# Patient Record
Sex: FEMALE | Race: WHITE | Employment: OTHER | URBAN - METROPOLITAN AREA
[De-identification: names, ages, dates, MRNs, and addresses within clinical notes are randomized per-mention and may not be internally consistent; named-entity substitution may affect disease eponyms.]

---

## 2017-01-01 ENCOUNTER — APPOINTMENT (OUTPATIENT)
Dept: CT IMAGING | Age: 77
DRG: 189 | End: 2017-01-01
Attending: EMERGENCY MEDICINE
Payer: MEDICARE

## 2017-01-01 ENCOUNTER — HOSPITAL ENCOUNTER (OUTPATIENT)
Dept: GENERAL RADIOLOGY | Age: 77
Discharge: HOME | End: 2017-11-20
Attending: NURSE PRACTITIONER
Payer: MEDICARE

## 2017-01-01 ENCOUNTER — HOME HEALTH ADMISSION (OUTPATIENT)
Dept: HOME HEALTH SERVICES | Facility: HOME HEALTH | Age: 77
End: 2017-01-01
Payer: MEDICARE

## 2017-01-01 ENCOUNTER — APPOINTMENT (OUTPATIENT)
Dept: GENERAL RADIOLOGY | Age: 77
DRG: 189 | End: 2017-01-01
Attending: EMERGENCY MEDICINE
Payer: MEDICARE

## 2017-01-01 ENCOUNTER — HOSPITAL ENCOUNTER (INPATIENT)
Age: 77
LOS: 9 days | Discharge: NURSING, REHAB OR LONG-TERM CARE | DRG: 189 | End: 2017-12-11
Attending: EMERGENCY MEDICINE | Admitting: INTERNAL MEDICINE
Payer: MEDICARE

## 2017-01-01 ENCOUNTER — APPOINTMENT (OUTPATIENT)
Dept: GENERAL RADIOLOGY | Age: 77
DRG: 189 | End: 2017-01-01
Attending: INTERNAL MEDICINE
Payer: MEDICARE

## 2017-01-01 VITALS
SYSTOLIC BLOOD PRESSURE: 113 MMHG | TEMPERATURE: 98.3 F | BODY MASS INDEX: 19.47 KG/M2 | RESPIRATION RATE: 20 BRPM | OXYGEN SATURATION: 93 % | HEIGHT: 65 IN | DIASTOLIC BLOOD PRESSURE: 70 MMHG | WEIGHT: 116.9 LBS | HEART RATE: 95 BPM

## 2017-01-01 DIAGNOSIS — I10 ESSENTIAL HYPERTENSION: ICD-10-CM

## 2017-01-01 DIAGNOSIS — J44.1 ACUTE EXACERBATION OF CHRONIC OBSTRUCTIVE PULMONARY DISEASE (COPD) (HCC): Primary | ICD-10-CM

## 2017-01-01 DIAGNOSIS — J20.9 ACUTE BRONCHITIS, UNSPECIFIED ORGANISM: ICD-10-CM

## 2017-01-01 DIAGNOSIS — J90 PLEURAL EFFUSION: ICD-10-CM

## 2017-01-01 DIAGNOSIS — J96.21 ACUTE ON CHRONIC RESPIRATORY FAILURE WITH HYPOXIA AND HYPERCAPNIA (HCC): ICD-10-CM

## 2017-01-01 DIAGNOSIS — R09.02 HYPOXIA: ICD-10-CM

## 2017-01-01 DIAGNOSIS — J96.02 ACUTE RESPIRATORY FAILURE WITH HYPOXIA AND HYPERCARBIA (HCC): ICD-10-CM

## 2017-01-01 DIAGNOSIS — J43.1 PANLOBULAR EMPHYSEMA (HCC): ICD-10-CM

## 2017-01-01 DIAGNOSIS — R06.02 SHORTNESS OF BREATH: ICD-10-CM

## 2017-01-01 DIAGNOSIS — J96.01 ACUTE RESPIRATORY FAILURE WITH HYPOXIA AND HYPERCARBIA (HCC): ICD-10-CM

## 2017-01-01 DIAGNOSIS — R53.81 DEBILITY: ICD-10-CM

## 2017-01-01 DIAGNOSIS — J44.1 CHRONIC OBSTRUCTIVE PULMONARY DISEASE WITH ACUTE EXACERBATION (HCC): ICD-10-CM

## 2017-01-01 DIAGNOSIS — J96.22 ACUTE ON CHRONIC RESPIRATORY FAILURE WITH HYPOXIA AND HYPERCAPNIA (HCC): ICD-10-CM

## 2017-01-01 LAB
ANION GAP SERPL CALC-SCNC: 5 MMOL/L (ref 7–16)
ANION GAP SERPL CALC-SCNC: 8 MMOL/L (ref 7–16)
ARTERIAL PATENCY WRIST A: POSITIVE
ATRIAL RATE: 110 BPM
ATRIAL RATE: 134 BPM
ATRIAL RATE: 99 BPM
BASE EXCESS BLDA CALC-SCNC: 6.8 MMOL/L (ref 0–3)
BASOPHILS # BLD: 0 K/UL (ref 0–0.2)
BASOPHILS # BLD: 0 K/UL (ref 0–0.2)
BASOPHILS NFR BLD: 0 % (ref 0–2)
BASOPHILS NFR BLD: 0 % (ref 0–2)
BDY SITE: ABNORMAL
BNP SERPL-MCNC: 233 PG/ML
BUN SERPL-MCNC: 13 MG/DL (ref 8–23)
BUN SERPL-MCNC: 16 MG/DL (ref 8–23)
CALCIUM SERPL-MCNC: 8.2 MG/DL (ref 8.3–10.4)
CALCIUM SERPL-MCNC: 9.4 MG/DL (ref 8.3–10.4)
CALCULATED P AXIS, ECG09: 86 DEGREES
CALCULATED P AXIS, ECG09: 87 DEGREES
CALCULATED P AXIS, ECG09: 93 DEGREES
CALCULATED R AXIS, ECG10: -81 DEGREES
CALCULATED R AXIS, ECG10: -82 DEGREES
CALCULATED R AXIS, ECG10: -88 DEGREES
CALCULATED T AXIS, ECG11: 85 DEGREES
CALCULATED T AXIS, ECG11: 85 DEGREES
CALCULATED T AXIS, ECG11: 89 DEGREES
CHLORIDE SERPL-SCNC: 97 MMOL/L (ref 98–107)
CHLORIDE SERPL-SCNC: 97 MMOL/L (ref 98–107)
CO2 SERPL-SCNC: 33 MMOL/L (ref 21–32)
CO2 SERPL-SCNC: 34 MMOL/L (ref 21–32)
COHGB MFR BLD: 5.6 % (ref 0.5–1.5)
CREAT SERPL-MCNC: 0.55 MG/DL (ref 0.6–1)
CREAT SERPL-MCNC: 0.58 MG/DL (ref 0.6–1)
D DIMER PPP FEU-MCNC: 0.94 UG/ML(FEU)
DIAGNOSIS, 93000: NORMAL
DIFFERENTIAL METHOD BLD: ABNORMAL
DIFFERENTIAL METHOD BLD: ABNORMAL
DO-HGB BLD-MCNC: 26 % (ref 0–5)
EOSINOPHIL # BLD: 0 K/UL (ref 0–0.8)
EOSINOPHIL # BLD: 0.2 K/UL (ref 0–0.8)
EOSINOPHIL NFR BLD: 0 % (ref 0.5–7.8)
EOSINOPHIL NFR BLD: 1 % (ref 0.5–7.8)
ERYTHROCYTE [DISTWIDTH] IN BLOOD BY AUTOMATED COUNT: 14.6 % (ref 11.9–14.6)
ERYTHROCYTE [DISTWIDTH] IN BLOOD BY AUTOMATED COUNT: 14.7 % (ref 11.9–14.6)
GLUCOSE BLD STRIP.AUTO-MCNC: 105 MG/DL (ref 65–100)
GLUCOSE BLD STRIP.AUTO-MCNC: 109 MG/DL (ref 65–100)
GLUCOSE BLD STRIP.AUTO-MCNC: 109 MG/DL (ref 65–100)
GLUCOSE BLD STRIP.AUTO-MCNC: 110 MG/DL (ref 65–100)
GLUCOSE BLD STRIP.AUTO-MCNC: 116 MG/DL (ref 65–100)
GLUCOSE BLD STRIP.AUTO-MCNC: 116 MG/DL (ref 65–100)
GLUCOSE BLD STRIP.AUTO-MCNC: 120 MG/DL (ref 65–100)
GLUCOSE BLD STRIP.AUTO-MCNC: 125 MG/DL (ref 65–100)
GLUCOSE BLD STRIP.AUTO-MCNC: 127 MG/DL (ref 65–100)
GLUCOSE BLD STRIP.AUTO-MCNC: 128 MG/DL (ref 65–100)
GLUCOSE BLD STRIP.AUTO-MCNC: 129 MG/DL (ref 65–100)
GLUCOSE BLD STRIP.AUTO-MCNC: 131 MG/DL (ref 65–100)
GLUCOSE BLD STRIP.AUTO-MCNC: 131 MG/DL (ref 65–100)
GLUCOSE BLD STRIP.AUTO-MCNC: 134 MG/DL (ref 65–100)
GLUCOSE BLD STRIP.AUTO-MCNC: 137 MG/DL (ref 65–100)
GLUCOSE BLD STRIP.AUTO-MCNC: 138 MG/DL (ref 65–100)
GLUCOSE BLD STRIP.AUTO-MCNC: 140 MG/DL (ref 65–100)
GLUCOSE BLD STRIP.AUTO-MCNC: 146 MG/DL (ref 65–100)
GLUCOSE BLD STRIP.AUTO-MCNC: 150 MG/DL (ref 65–100)
GLUCOSE BLD STRIP.AUTO-MCNC: 157 MG/DL (ref 65–100)
GLUCOSE BLD STRIP.AUTO-MCNC: 158 MG/DL (ref 65–100)
GLUCOSE BLD STRIP.AUTO-MCNC: 166 MG/DL (ref 65–100)
GLUCOSE BLD STRIP.AUTO-MCNC: 78 MG/DL (ref 65–100)
GLUCOSE BLD STRIP.AUTO-MCNC: 83 MG/DL (ref 65–100)
GLUCOSE BLD STRIP.AUTO-MCNC: 85 MG/DL (ref 65–100)
GLUCOSE BLD STRIP.AUTO-MCNC: 86 MG/DL (ref 65–100)
GLUCOSE BLD STRIP.AUTO-MCNC: 86 MG/DL (ref 65–100)
GLUCOSE BLD STRIP.AUTO-MCNC: 87 MG/DL (ref 65–100)
GLUCOSE BLD STRIP.AUTO-MCNC: 87 MG/DL (ref 65–100)
GLUCOSE BLD STRIP.AUTO-MCNC: 90 MG/DL (ref 65–100)
GLUCOSE BLD STRIP.AUTO-MCNC: 92 MG/DL (ref 65–100)
GLUCOSE BLD STRIP.AUTO-MCNC: 94 MG/DL (ref 65–100)
GLUCOSE SERPL-MCNC: 113 MG/DL (ref 65–100)
GLUCOSE SERPL-MCNC: 139 MG/DL (ref 65–100)
HCO3 BLDA-SCNC: 34 MMOL/L (ref 22–26)
HCT VFR BLD AUTO: 41.8 % (ref 35.8–46.3)
HCT VFR BLD AUTO: 43.9 % (ref 35.8–46.3)
HGB BLD-MCNC: 13.1 G/DL (ref 11.7–15.4)
HGB BLD-MCNC: 14.4 G/DL (ref 11.7–15.4)
HGB BLDMV-MCNC: 14.6 GM/DL (ref 11.7–15)
IMM GRANULOCYTES # BLD: 0 K/UL (ref 0–0.5)
IMM GRANULOCYTES # BLD: 0.1 K/UL (ref 0–0.5)
IMM GRANULOCYTES NFR BLD AUTO: 0 % (ref 0–5)
IMM GRANULOCYTES NFR BLD AUTO: 1 % (ref 0–5)
LACTATE BLD-SCNC: 1.2 MMOL/L (ref 0.5–1.9)
LYMPHOCYTES # BLD: 0.5 K/UL (ref 0.5–4.6)
LYMPHOCYTES # BLD: 0.6 K/UL (ref 0.5–4.6)
LYMPHOCYTES NFR BLD: 5 % (ref 13–44)
LYMPHOCYTES NFR BLD: 5 % (ref 13–44)
MAGNESIUM SERPL-MCNC: 2.1 MG/DL (ref 1.8–2.4)
MCH RBC QN AUTO: 28.4 PG (ref 26.1–32.9)
MCH RBC QN AUTO: 29 PG (ref 26.1–32.9)
MCHC RBC AUTO-ENTMCNC: 31.3 G/DL (ref 31.4–35)
MCHC RBC AUTO-ENTMCNC: 32.8 G/DL (ref 31.4–35)
MCV RBC AUTO: 88.3 FL (ref 79.6–97.8)
MCV RBC AUTO: 90.5 FL (ref 79.6–97.8)
METHGB MFR BLD: 0.3 % (ref 0–1.5)
MM INDURATION POC: 0 MM (ref 0–5)
MM INDURATION POC: NORMAL MM (ref 0–5)
MM INDURATION POC: NORMAL MM (ref 0–5)
MONOCYTES # BLD: 0.3 K/UL (ref 0.1–1.3)
MONOCYTES # BLD: 0.4 K/UL (ref 0.1–1.3)
MONOCYTES NFR BLD: 3 % (ref 4–12)
MONOCYTES NFR BLD: 3 % (ref 4–12)
NEUTS SEG # BLD: 12.1 K/UL (ref 1.7–8.2)
NEUTS SEG # BLD: 8 K/UL (ref 1.7–8.2)
NEUTS SEG NFR BLD: 91 % (ref 43–78)
NEUTS SEG NFR BLD: 91 % (ref 43–78)
OXYHGB MFR BLDA: 68.5 % (ref 94–97)
P-R INTERVAL, ECG05: 144 MS
P-R INTERVAL, ECG05: 152 MS
P-R INTERVAL, ECG05: 168 MS
PCO2 BLDA: 60 MMHG (ref 35–45)
PH BLDA: 7.38 [PH] (ref 7.35–7.45)
PLATELET # BLD AUTO: 231 K/UL (ref 150–450)
PLATELET # BLD AUTO: 241 K/UL (ref 150–450)
PMV BLD AUTO: 10.1 FL (ref 10.8–14.1)
PMV BLD AUTO: 10.4 FL (ref 10.8–14.1)
PO2 BLDA: 35 MMHG (ref 80–105)
POTASSIUM SERPL-SCNC: 3.7 MMOL/L (ref 3.5–5.1)
POTASSIUM SERPL-SCNC: 4.4 MMOL/L (ref 3.5–5.1)
PPD POC: NORMAL NEGATIVE
PROCALCITONIN SERPL-MCNC: <0.1 NG/ML
Q-T INTERVAL, ECG07: 272 MS
Q-T INTERVAL, ECG07: 328 MS
Q-T INTERVAL, ECG07: 338 MS
QRS DURATION, ECG06: 84 MS
QRS DURATION, ECG06: 84 MS
QRS DURATION, ECG06: 86 MS
QTC CALCULATION (BEZET), ECG08: 406 MS
QTC CALCULATION (BEZET), ECG08: 420 MS
QTC CALCULATION (BEZET), ECG08: 457 MS
RBC # BLD AUTO: 4.62 M/UL (ref 4.05–5.25)
RBC # BLD AUTO: 4.97 M/UL (ref 4.05–5.25)
SAO2 % BLD: 73 % (ref 92–98.5)
SERVICE CMNT-IMP: ABNORMAL
SODIUM SERPL-SCNC: 136 MMOL/L (ref 136–145)
SODIUM SERPL-SCNC: 138 MMOL/L (ref 136–145)
TROPONIN I BLD-MCNC: 0.01 NG/ML (ref 0.02–0.05)
VENTILATION MODE VENT: ABNORMAL
VENTRICULAR RATE, ECG03: 110 BPM
VENTRICULAR RATE, ECG03: 134 BPM
VENTRICULAR RATE, ECG03: 99 BPM
WBC # BLD AUTO: 13.4 K/UL (ref 4.3–11.1)
WBC # BLD AUTO: 8.7 K/UL (ref 4.3–11.1)

## 2017-01-01 PROCEDURE — 77010033678 HC OXYGEN DAILY

## 2017-01-01 PROCEDURE — 74011250637 HC RX REV CODE- 250/637: Performed by: INTERNAL MEDICINE

## 2017-01-01 PROCEDURE — 97530 THERAPEUTIC ACTIVITIES: CPT

## 2017-01-01 PROCEDURE — 94760 N-INVAS EAR/PLS OXIMETRY 1: CPT

## 2017-01-01 PROCEDURE — 96376 TX/PRO/DX INJ SAME DRUG ADON: CPT | Performed by: EMERGENCY MEDICINE

## 2017-01-01 PROCEDURE — 96365 THER/PROPH/DIAG IV INF INIT: CPT | Performed by: EMERGENCY MEDICINE

## 2017-01-01 PROCEDURE — 74011000250 HC RX REV CODE- 250: Performed by: INTERNAL MEDICINE

## 2017-01-01 PROCEDURE — 99232 SBSQ HOSP IP/OBS MODERATE 35: CPT | Performed by: INTERNAL MEDICINE

## 2017-01-01 PROCEDURE — 93005 ELECTROCARDIOGRAM TRACING: CPT | Performed by: INTERNAL MEDICINE

## 2017-01-01 PROCEDURE — 74011636637 HC RX REV CODE- 636/637: Performed by: INTERNAL MEDICINE

## 2017-01-01 PROCEDURE — 94640 AIRWAY INHALATION TREATMENT: CPT

## 2017-01-01 PROCEDURE — 74011250636 HC RX REV CODE- 250/636: Performed by: INTERNAL MEDICINE

## 2017-01-01 PROCEDURE — 74011636320 HC RX REV CODE- 636/320: Performed by: EMERGENCY MEDICINE

## 2017-01-01 PROCEDURE — 82962 GLUCOSE BLOOD TEST: CPT

## 2017-01-01 PROCEDURE — 80048 BASIC METABOLIC PNL TOTAL CA: CPT | Performed by: INTERNAL MEDICINE

## 2017-01-01 PROCEDURE — 85025 COMPLETE CBC W/AUTO DIFF WBC: CPT | Performed by: INTERNAL MEDICINE

## 2017-01-01 PROCEDURE — 86580 TB INTRADERMAL TEST: CPT | Performed by: INTERNAL MEDICINE

## 2017-01-01 PROCEDURE — 65270000029 HC RM PRIVATE

## 2017-01-01 PROCEDURE — 96375 TX/PRO/DX INJ NEW DRUG ADDON: CPT | Performed by: EMERGENCY MEDICINE

## 2017-01-01 PROCEDURE — 77030020120 HC VLV RESP PEP HI -B

## 2017-01-01 PROCEDURE — 36415 COLL VENOUS BLD VENIPUNCTURE: CPT | Performed by: INTERNAL MEDICINE

## 2017-01-01 PROCEDURE — 74011000250 HC RX REV CODE- 250: Performed by: EMERGENCY MEDICINE

## 2017-01-01 PROCEDURE — 85379 FIBRIN DEGRADATION QUANT: CPT | Performed by: EMERGENCY MEDICINE

## 2017-01-01 PROCEDURE — 84484 ASSAY OF TROPONIN QUANT: CPT

## 2017-01-01 PROCEDURE — 99231 SBSQ HOSP IP/OBS SF/LOW 25: CPT | Performed by: INTERNAL MEDICINE

## 2017-01-01 PROCEDURE — 71020 XR CHEST PA LAT: CPT

## 2017-01-01 PROCEDURE — 80048 BASIC METABOLIC PNL TOTAL CA: CPT | Performed by: EMERGENCY MEDICINE

## 2017-01-01 PROCEDURE — 84145 PROCALCITONIN (PCT): CPT | Performed by: INTERNAL MEDICINE

## 2017-01-01 PROCEDURE — 74011000258 HC RX REV CODE- 258: Performed by: EMERGENCY MEDICINE

## 2017-01-01 PROCEDURE — 94667 MNPJ CHEST WALL 1ST: CPT

## 2017-01-01 PROCEDURE — 97165 OT EVAL LOW COMPLEX 30 MIN: CPT

## 2017-01-01 PROCEDURE — 74011250636 HC RX REV CODE- 250/636: Performed by: EMERGENCY MEDICINE

## 2017-01-01 PROCEDURE — 74011000302 HC RX REV CODE- 302: Performed by: INTERNAL MEDICINE

## 2017-01-01 PROCEDURE — 94668 MNPJ CHEST WALL SBSQ: CPT

## 2017-01-01 PROCEDURE — 99285 EMERGENCY DEPT VISIT HI MDM: CPT | Performed by: EMERGENCY MEDICINE

## 2017-01-01 PROCEDURE — 82803 BLOOD GASES ANY COMBINATION: CPT

## 2017-01-01 PROCEDURE — 74011000258 HC RX REV CODE- 258: Performed by: INTERNAL MEDICINE

## 2017-01-01 PROCEDURE — 93005 ELECTROCARDIOGRAM TRACING: CPT | Performed by: EMERGENCY MEDICINE

## 2017-01-01 PROCEDURE — 97162 PT EVAL MOD COMPLEX 30 MIN: CPT

## 2017-01-01 PROCEDURE — 83880 ASSAY OF NATRIURETIC PEPTIDE: CPT | Performed by: EMERGENCY MEDICINE

## 2017-01-01 PROCEDURE — 36600 WITHDRAWAL OF ARTERIAL BLOOD: CPT

## 2017-01-01 PROCEDURE — 71260 CT THORAX DX C+: CPT

## 2017-01-01 PROCEDURE — 77030012341 HC CHMB SPCR OPTC MDI VYRM -A

## 2017-01-01 PROCEDURE — 71010 XR CHEST SNGL V: CPT

## 2017-01-01 PROCEDURE — 85025 COMPLETE CBC W/AUTO DIFF WBC: CPT | Performed by: EMERGENCY MEDICINE

## 2017-01-01 PROCEDURE — 83605 ASSAY OF LACTIC ACID: CPT

## 2017-01-01 PROCEDURE — 99239 HOSP IP/OBS DSCHRG MGMT >30: CPT | Performed by: INTERNAL MEDICINE

## 2017-01-01 PROCEDURE — 83735 ASSAY OF MAGNESIUM: CPT | Performed by: EMERGENCY MEDICINE

## 2017-01-01 PROCEDURE — 99223 1ST HOSP IP/OBS HIGH 75: CPT | Performed by: INTERNAL MEDICINE

## 2017-01-01 RX ORDER — SODIUM CHLORIDE 0.9 % (FLUSH) 0.9 %
10 SYRINGE (ML) INJECTION
Status: COMPLETED | OUTPATIENT
Start: 2017-01-01 | End: 2017-01-01

## 2017-01-01 RX ORDER — IPRATROPIUM BROMIDE AND ALBUTEROL SULFATE 2.5; .5 MG/3ML; MG/3ML
3 SOLUTION RESPIRATORY (INHALATION)
Status: DISCONTINUED | OUTPATIENT
Start: 2017-01-01 | End: 2017-01-01

## 2017-01-01 RX ORDER — ALBUTEROL SULFATE 0.83 MG/ML
5 SOLUTION RESPIRATORY (INHALATION)
Status: COMPLETED | OUTPATIENT
Start: 2017-01-01 | End: 2017-01-01

## 2017-01-01 RX ORDER — ALBUTEROL SULFATE 0.83 MG/ML
2.5 SOLUTION RESPIRATORY (INHALATION)
Status: DISCONTINUED | OUTPATIENT
Start: 2017-01-01 | End: 2017-01-01 | Stop reason: HOSPADM

## 2017-01-01 RX ORDER — LEVOFLOXACIN 5 MG/ML
750 INJECTION, SOLUTION INTRAVENOUS
Status: COMPLETED | OUTPATIENT
Start: 2017-01-01 | End: 2017-01-01

## 2017-01-01 RX ORDER — INSULIN LISPRO 100 [IU]/ML
INJECTION, SOLUTION INTRAVENOUS; SUBCUTANEOUS
Status: DISCONTINUED | OUTPATIENT
Start: 2017-01-01 | End: 2017-01-01 | Stop reason: HOSPADM

## 2017-01-01 RX ORDER — MORPHINE SULFATE 2 MG/ML
2 INJECTION, SOLUTION INTRAMUSCULAR; INTRAVENOUS
Status: DISCONTINUED | OUTPATIENT
Start: 2017-01-01 | End: 2017-01-01 | Stop reason: HOSPADM

## 2017-01-01 RX ORDER — MONTELUKAST SODIUM 10 MG/1
10 TABLET ORAL DAILY
Status: DISCONTINUED | OUTPATIENT
Start: 2017-01-01 | End: 2017-01-01 | Stop reason: HOSPADM

## 2017-01-01 RX ORDER — DOCUSATE SODIUM 100 MG/1
100 CAPSULE, LIQUID FILLED ORAL 2 TIMES DAILY
Qty: 1 CAP | Refills: 0 | Status: SHIPPED
Start: 2017-01-01 | End: 2018-01-01

## 2017-01-01 RX ORDER — SODIUM CHLORIDE 0.9 % (FLUSH) 0.9 %
5-10 SYRINGE (ML) INJECTION EVERY 8 HOURS
Status: DISCONTINUED | OUTPATIENT
Start: 2017-01-01 | End: 2017-01-01 | Stop reason: HOSPADM

## 2017-01-01 RX ORDER — BUDESONIDE 0.5 MG/2ML
500 INHALANT ORAL
Status: DISCONTINUED | OUTPATIENT
Start: 2017-01-01 | End: 2017-01-01 | Stop reason: HOSPADM

## 2017-01-01 RX ORDER — ALBUTEROL SULFATE 0.83 MG/ML
2.5 SOLUTION RESPIRATORY (INHALATION) 4 TIMES DAILY
Qty: 24 EACH | Refills: 0 | Status: SHIPPED
Start: 2017-01-01 | End: 2018-01-01 | Stop reason: SDUPTHER

## 2017-01-01 RX ORDER — GUAIFENESIN 600 MG/1
1200 TABLET, EXTENDED RELEASE ORAL 2 TIMES DAILY
Status: DISCONTINUED | OUTPATIENT
Start: 2017-01-01 | End: 2017-01-01 | Stop reason: HOSPADM

## 2017-01-01 RX ORDER — FAMOTIDINE 20 MG/1
20 TABLET, FILM COATED ORAL DAILY
Status: DISCONTINUED | OUTPATIENT
Start: 2017-01-01 | End: 2017-01-01 | Stop reason: HOSPADM

## 2017-01-01 RX ORDER — CEFTRIAXONE 1 G/1
1 INJECTION, POWDER, FOR SOLUTION INTRAMUSCULAR; INTRAVENOUS ONCE
Status: DISCONTINUED | OUTPATIENT
Start: 2017-01-01 | End: 2017-01-01 | Stop reason: SDUPTHER

## 2017-01-01 RX ORDER — DOCUSATE SODIUM 100 MG/1
100 CAPSULE, LIQUID FILLED ORAL 2 TIMES DAILY
Status: DISCONTINUED | OUTPATIENT
Start: 2017-01-01 | End: 2017-01-01 | Stop reason: HOSPADM

## 2017-01-01 RX ORDER — DILTIAZEM HYDROCHLORIDE 5 MG/ML
20 INJECTION INTRAVENOUS
Status: COMPLETED | OUTPATIENT
Start: 2017-01-01 | End: 2017-01-01

## 2017-01-01 RX ORDER — ENOXAPARIN SODIUM 100 MG/ML
30 INJECTION SUBCUTANEOUS EVERY 24 HOURS
Status: DISCONTINUED | OUTPATIENT
Start: 2017-01-01 | End: 2017-01-01 | Stop reason: HOSPADM

## 2017-01-01 RX ORDER — DILTIAZEM HYDROCHLORIDE 5 MG/ML
15 INJECTION INTRAVENOUS
Status: COMPLETED | OUTPATIENT
Start: 2017-01-01 | End: 2017-01-01

## 2017-01-01 RX ORDER — FAMOTIDINE 20 MG/1
20 TABLET, FILM COATED ORAL DAILY
Qty: 1 TAB | Refills: 0 | Status: SHIPPED
Start: 2017-01-01 | End: 2018-01-01 | Stop reason: SDUPTHER

## 2017-01-01 RX ORDER — PREDNISONE 20 MG/1
TABLET ORAL
Qty: 6 TAB | Refills: 0 | Status: SHIPPED
Start: 2017-01-01 | End: 2018-01-01

## 2017-01-01 RX ORDER — LISINOPRIL 20 MG/1
20 TABLET ORAL DAILY
Status: DISCONTINUED | OUTPATIENT
Start: 2017-01-01 | End: 2017-01-01 | Stop reason: HOSPADM

## 2017-01-01 RX ORDER — SODIUM CHLORIDE 0.9 % (FLUSH) 0.9 %
5-10 SYRINGE (ML) INJECTION AS NEEDED
Status: DISCONTINUED | OUTPATIENT
Start: 2017-01-01 | End: 2017-01-01 | Stop reason: HOSPADM

## 2017-01-01 RX ADMIN — IPRATROPIUM BROMIDE AND ALBUTEROL SULFATE 3 ML: .5; 3 SOLUTION RESPIRATORY (INHALATION) at 19:51

## 2017-01-01 RX ADMIN — LISINOPRIL 20 MG: 20 TABLET ORAL at 08:59

## 2017-01-01 RX ADMIN — IPRATROPIUM BROMIDE AND ALBUTEROL SULFATE 3 ML: .5; 3 SOLUTION RESPIRATORY (INHALATION) at 03:37

## 2017-01-01 RX ADMIN — DOCUSATE SODIUM 100 MG: 100 CAPSULE, LIQUID FILLED ORAL at 08:59

## 2017-01-01 RX ADMIN — ENOXAPARIN SODIUM 30 MG: 30 INJECTION SUBCUTANEOUS at 09:08

## 2017-01-01 RX ADMIN — IPRATROPIUM BROMIDE AND ALBUTEROL SULFATE 3 ML: .5; 3 SOLUTION RESPIRATORY (INHALATION) at 03:44

## 2017-01-01 RX ADMIN — METHYLPREDNISOLONE SODIUM SUCCINATE 60 MG: 125 INJECTION, POWDER, FOR SOLUTION INTRAMUSCULAR; INTRAVENOUS at 22:34

## 2017-01-01 RX ADMIN — ENOXAPARIN SODIUM 30 MG: 30 INJECTION SUBCUTANEOUS at 08:52

## 2017-01-01 RX ADMIN — ENOXAPARIN SODIUM 30 MG: 30 INJECTION SUBCUTANEOUS at 08:59

## 2017-01-01 RX ADMIN — BUDESONIDE 500 MCG: 0.5 INHALANT RESPIRATORY (INHALATION) at 08:18

## 2017-01-01 RX ADMIN — MONTELUKAST SODIUM 10 MG: 10 TABLET, FILM COATED ORAL at 08:41

## 2017-01-01 RX ADMIN — Medication 5 ML: at 06:20

## 2017-01-01 RX ADMIN — MONTELUKAST SODIUM 10 MG: 10 TABLET, FILM COATED ORAL at 08:30

## 2017-01-01 RX ADMIN — Medication 5 ML: at 12:01

## 2017-01-01 RX ADMIN — Medication 10 ML: at 22:34

## 2017-01-01 RX ADMIN — ENOXAPARIN SODIUM 30 MG: 30 INJECTION SUBCUTANEOUS at 08:32

## 2017-01-01 RX ADMIN — METHYLPREDNISOLONE SODIUM SUCCINATE 60 MG: 125 INJECTION, POWDER, FOR SOLUTION INTRAMUSCULAR; INTRAVENOUS at 21:36

## 2017-01-01 RX ADMIN — BUDESONIDE 500 MCG: 0.5 INHALANT RESPIRATORY (INHALATION) at 20:07

## 2017-01-01 RX ADMIN — IPRATROPIUM BROMIDE AND ALBUTEROL SULFATE 3 ML: .5; 3 SOLUTION RESPIRATORY (INHALATION) at 23:43

## 2017-01-01 RX ADMIN — CEFTRIAXONE SODIUM 1 G: 1 INJECTION, POWDER, FOR SOLUTION INTRAMUSCULAR; INTRAVENOUS at 05:00

## 2017-01-01 RX ADMIN — MONTELUKAST SODIUM 10 MG: 10 TABLET, FILM COATED ORAL at 08:21

## 2017-01-01 RX ADMIN — MONTELUKAST SODIUM 10 MG: 10 TABLET, FILM COATED ORAL at 08:59

## 2017-01-01 RX ADMIN — ALBUTEROL SULFATE 2.5 MG: 2.5 SOLUTION RESPIRATORY (INHALATION) at 15:11

## 2017-01-01 RX ADMIN — DILTIAZEM HYDROCHLORIDE 15 MG: 5 INJECTION INTRAVENOUS at 21:52

## 2017-01-01 RX ADMIN — METHYLPREDNISOLONE SODIUM SUCCINATE 60 MG: 125 INJECTION, POWDER, FOR SOLUTION INTRAMUSCULAR; INTRAVENOUS at 22:15

## 2017-01-01 RX ADMIN — FAMOTIDINE 20 MG: 20 TABLET ORAL at 08:46

## 2017-01-01 RX ADMIN — ALBUTEROL SULFATE 2.5 MG: 2.5 SOLUTION RESPIRATORY (INHALATION) at 20:51

## 2017-01-01 RX ADMIN — Medication 5 ML: at 11:49

## 2017-01-01 RX ADMIN — IPRATROPIUM BROMIDE AND ALBUTEROL SULFATE 3 ML: .5; 3 SOLUTION RESPIRATORY (INHALATION) at 11:31

## 2017-01-01 RX ADMIN — METHYLPREDNISOLONE SODIUM SUCCINATE 60 MG: 125 INJECTION, POWDER, FOR SOLUTION INTRAMUSCULAR; INTRAVENOUS at 05:59

## 2017-01-01 RX ADMIN — Medication 5 ML: at 11:25

## 2017-01-01 RX ADMIN — IPRATROPIUM BROMIDE AND ALBUTEROL SULFATE 3 ML: .5; 3 SOLUTION RESPIRATORY (INHALATION) at 20:20

## 2017-01-01 RX ADMIN — Medication 5 ML: at 05:23

## 2017-01-01 RX ADMIN — METHYLPREDNISOLONE SODIUM SUCCINATE 60 MG: 125 INJECTION, POWDER, FOR SOLUTION INTRAMUSCULAR; INTRAVENOUS at 08:41

## 2017-01-01 RX ADMIN — BUDESONIDE 500 MCG: 0.5 INHALANT RESPIRATORY (INHALATION) at 19:51

## 2017-01-01 RX ADMIN — BUDESONIDE 500 MCG: 0.5 INHALANT RESPIRATORY (INHALATION) at 07:34

## 2017-01-01 RX ADMIN — LISINOPRIL 20 MG: 20 TABLET ORAL at 08:22

## 2017-01-01 RX ADMIN — DOCUSATE SODIUM 100 MG: 100 CAPSULE, LIQUID FILLED ORAL at 08:30

## 2017-01-01 RX ADMIN — BUDESONIDE 500 MCG: 0.5 INHALANT RESPIRATORY (INHALATION) at 07:17

## 2017-01-01 RX ADMIN — Medication 10 ML: at 12:38

## 2017-01-01 RX ADMIN — IPRATROPIUM BROMIDE AND ALBUTEROL SULFATE 3 ML: .5; 3 SOLUTION RESPIRATORY (INHALATION) at 10:45

## 2017-01-01 RX ADMIN — GUAIFENESIN 1200 MG: 600 TABLET, EXTENDED RELEASE ORAL at 16:11

## 2017-01-01 RX ADMIN — FAMOTIDINE 20 MG: 20 TABLET ORAL at 08:21

## 2017-01-01 RX ADMIN — METHYLPREDNISOLONE SODIUM SUCCINATE 30 MG: 40 INJECTION, POWDER, FOR SOLUTION INTRAMUSCULAR; INTRAVENOUS at 08:22

## 2017-01-01 RX ADMIN — Medication 10 ML: at 14:53

## 2017-01-01 RX ADMIN — IPRATROPIUM BROMIDE AND ALBUTEROL SULFATE 3 ML: .5; 3 SOLUTION RESPIRATORY (INHALATION) at 15:22

## 2017-01-01 RX ADMIN — ENOXAPARIN SODIUM 30 MG: 30 INJECTION SUBCUTANEOUS at 08:30

## 2017-01-01 RX ADMIN — BUDESONIDE 500 MCG: 0.5 INHALANT RESPIRATORY (INHALATION) at 19:37

## 2017-01-01 RX ADMIN — DOCUSATE SODIUM 100 MG: 100 CAPSULE, LIQUID FILLED ORAL at 00:51

## 2017-01-01 RX ADMIN — DOCUSATE SODIUM 100 MG: 100 CAPSULE, LIQUID FILLED ORAL at 16:11

## 2017-01-01 RX ADMIN — ALBUTEROL SULFATE 2.5 MG: 2.5 SOLUTION RESPIRATORY (INHALATION) at 20:36

## 2017-01-01 RX ADMIN — METHYLPREDNISOLONE SODIUM SUCCINATE 60 MG: 125 INJECTION, POWDER, FOR SOLUTION INTRAMUSCULAR; INTRAVENOUS at 06:00

## 2017-01-01 RX ADMIN — IPRATROPIUM BROMIDE AND ALBUTEROL SULFATE 3 ML: .5; 3 SOLUTION RESPIRATORY (INHALATION) at 20:47

## 2017-01-01 RX ADMIN — DILTIAZEM HYDROCHLORIDE 20 MG: 5 INJECTION INTRAVENOUS at 20:55

## 2017-01-01 RX ADMIN — FAMOTIDINE 20 MG: 20 TABLET ORAL at 09:09

## 2017-01-01 RX ADMIN — Medication 5 ML: at 14:25

## 2017-01-01 RX ADMIN — IPRATROPIUM BROMIDE AND ALBUTEROL SULFATE 3 ML: .5; 3 SOLUTION RESPIRATORY (INHALATION) at 16:14

## 2017-01-01 RX ADMIN — GUAIFENESIN 1200 MG: 600 TABLET, EXTENDED RELEASE ORAL at 08:45

## 2017-01-01 RX ADMIN — GUAIFENESIN 1200 MG: 600 TABLET, EXTENDED RELEASE ORAL at 08:22

## 2017-01-01 RX ADMIN — BUDESONIDE 500 MCG: 0.5 INHALANT RESPIRATORY (INHALATION) at 20:36

## 2017-01-01 RX ADMIN — BUDESONIDE 500 MCG: 0.5 INHALANT RESPIRATORY (INHALATION) at 07:30

## 2017-01-01 RX ADMIN — Medication 5 ML: at 16:52

## 2017-01-01 RX ADMIN — Medication 5 ML: at 16:47

## 2017-01-01 RX ADMIN — Medication 5 ML: at 16:15

## 2017-01-01 RX ADMIN — FAMOTIDINE 20 MG: 20 TABLET ORAL at 08:57

## 2017-01-01 RX ADMIN — IPRATROPIUM BROMIDE AND ALBUTEROL SULFATE 3 ML: .5; 3 SOLUTION RESPIRATORY (INHALATION) at 00:07

## 2017-01-01 RX ADMIN — GUAIFENESIN 1200 MG: 600 TABLET, EXTENDED RELEASE ORAL at 16:53

## 2017-01-01 RX ADMIN — ENOXAPARIN SODIUM 30 MG: 30 INJECTION SUBCUTANEOUS at 08:21

## 2017-01-01 RX ADMIN — Medication 10 ML: at 22:00

## 2017-01-01 RX ADMIN — Medication 5 ML: at 12:00

## 2017-01-01 RX ADMIN — DOCUSATE SODIUM 100 MG: 100 CAPSULE, LIQUID FILLED ORAL at 16:48

## 2017-01-01 RX ADMIN — ALBUTEROL SULFATE 2.5 MG: 2.5 SOLUTION RESPIRATORY (INHALATION) at 08:26

## 2017-01-01 RX ADMIN — Medication 5 ML: at 11:56

## 2017-01-01 RX ADMIN — DOCUSATE SODIUM 100 MG: 100 CAPSULE, LIQUID FILLED ORAL at 08:33

## 2017-01-01 RX ADMIN — IPRATROPIUM BROMIDE AND ALBUTEROL SULFATE 3 ML: .5; 3 SOLUTION RESPIRATORY (INHALATION) at 07:34

## 2017-01-01 RX ADMIN — BUDESONIDE 500 MCG: 0.5 INHALANT RESPIRATORY (INHALATION) at 19:38

## 2017-01-01 RX ADMIN — ALBUTEROL SULFATE 2.5 MG: 2.5 SOLUTION RESPIRATORY (INHALATION) at 12:51

## 2017-01-01 RX ADMIN — ALBUTEROL SULFATE 2.5 MG: 2.5 SOLUTION RESPIRATORY (INHALATION) at 11:13

## 2017-01-01 RX ADMIN — IPRATROPIUM BROMIDE AND ALBUTEROL SULFATE 3 ML: .5; 3 SOLUTION RESPIRATORY (INHALATION) at 04:55

## 2017-01-01 RX ADMIN — Medication 5 ML: at 00:32

## 2017-01-01 RX ADMIN — DOCUSATE SODIUM 100 MG: 100 CAPSULE, LIQUID FILLED ORAL at 08:22

## 2017-01-01 RX ADMIN — Medication 5 ML: at 16:54

## 2017-01-01 RX ADMIN — FAMOTIDINE 20 MG: 20 TABLET ORAL at 08:22

## 2017-01-01 RX ADMIN — DOCUSATE SODIUM 100 MG: 100 CAPSULE, LIQUID FILLED ORAL at 08:52

## 2017-01-01 RX ADMIN — GUAIFENESIN 1200 MG: 600 TABLET, EXTENDED RELEASE ORAL at 08:52

## 2017-01-01 RX ADMIN — Medication 5 ML: at 01:00

## 2017-01-01 RX ADMIN — BUDESONIDE 500 MCG: 0.5 INHALANT RESPIRATORY (INHALATION) at 07:15

## 2017-01-01 RX ADMIN — ALBUTEROL SULFATE 5 MG: 2.5 SOLUTION RESPIRATORY (INHALATION) at 00:07

## 2017-01-01 RX ADMIN — IOPAMIDOL 100 ML: 755 INJECTION, SOLUTION INTRAVENOUS at 23:34

## 2017-01-01 RX ADMIN — LISINOPRIL 20 MG: 20 TABLET ORAL at 09:09

## 2017-01-01 RX ADMIN — Medication 5 ML: at 16:58

## 2017-01-01 RX ADMIN — DOCUSATE SODIUM 100 MG: 100 CAPSULE, LIQUID FILLED ORAL at 08:41

## 2017-01-01 RX ADMIN — FAMOTIDINE 20 MG: 20 TABLET ORAL at 08:30

## 2017-01-01 RX ADMIN — Medication 10 ML: at 05:43

## 2017-01-01 RX ADMIN — LEVOFLOXACIN 750 MG: 5 INJECTION, SOLUTION INTRAVENOUS at 00:27

## 2017-01-01 RX ADMIN — METHYLPREDNISOLONE SODIUM SUCCINATE 60 MG: 125 INJECTION, POWDER, FOR SOLUTION INTRAMUSCULAR; INTRAVENOUS at 14:25

## 2017-01-01 RX ADMIN — Medication 5 ML: at 09:10

## 2017-01-01 RX ADMIN — Medication 10 ML: at 14:21

## 2017-01-01 RX ADMIN — MONTELUKAST SODIUM 10 MG: 10 TABLET, FILM COATED ORAL at 08:52

## 2017-01-01 RX ADMIN — Medication 10 ML: at 16:52

## 2017-01-01 RX ADMIN — IPRATROPIUM BROMIDE AND ALBUTEROL SULFATE 3 ML: .5; 3 SOLUTION RESPIRATORY (INHALATION) at 19:37

## 2017-01-01 RX ADMIN — Medication 10 ML: at 23:34

## 2017-01-01 RX ADMIN — Medication 5 ML: at 06:01

## 2017-01-01 RX ADMIN — MONTELUKAST SODIUM 10 MG: 10 TABLET, FILM COATED ORAL at 09:09

## 2017-01-01 RX ADMIN — DOCUSATE SODIUM 100 MG: 100 CAPSULE, LIQUID FILLED ORAL at 16:53

## 2017-01-01 RX ADMIN — Medication 5 ML: at 12:12

## 2017-01-01 RX ADMIN — IPRATROPIUM BROMIDE AND ALBUTEROL SULFATE 3 ML: .5; 3 SOLUTION RESPIRATORY (INHALATION) at 11:52

## 2017-01-01 RX ADMIN — IPRATROPIUM BROMIDE AND ALBUTEROL SULFATE 3 ML: .5; 3 SOLUTION RESPIRATORY (INHALATION) at 03:59

## 2017-01-01 RX ADMIN — BUDESONIDE 500 MCG: 0.5 INHALANT RESPIRATORY (INHALATION) at 07:08

## 2017-01-01 RX ADMIN — SODIUM CHLORIDE 100 ML: 900 INJECTION, SOLUTION INTRAVENOUS at 23:34

## 2017-01-01 RX ADMIN — IPRATROPIUM BROMIDE AND ALBUTEROL SULFATE 3 ML: .5; 3 SOLUTION RESPIRATORY (INHALATION) at 08:18

## 2017-01-01 RX ADMIN — Medication 5 ML: at 22:00

## 2017-01-01 RX ADMIN — BUDESONIDE 500 MCG: 0.5 INHALANT RESPIRATORY (INHALATION) at 20:20

## 2017-01-01 RX ADMIN — METHYLPREDNISOLONE SODIUM SUCCINATE 20 MG: 40 INJECTION, POWDER, FOR SOLUTION INTRAMUSCULAR; INTRAVENOUS at 08:30

## 2017-01-01 RX ADMIN — Medication 5 ML: at 23:35

## 2017-01-01 RX ADMIN — Medication 5 ML: at 05:59

## 2017-01-01 RX ADMIN — FAMOTIDINE 20 MG: 20 TABLET ORAL at 08:32

## 2017-01-01 RX ADMIN — Medication 10 ML: at 05:17

## 2017-01-01 RX ADMIN — IPRATROPIUM BROMIDE AND ALBUTEROL SULFATE 3 ML: .5; 3 SOLUTION RESPIRATORY (INHALATION) at 07:30

## 2017-01-01 RX ADMIN — ALBUTEROL SULFATE 2.5 MG: 2.5 SOLUTION RESPIRATORY (INHALATION) at 12:01

## 2017-01-01 RX ADMIN — BUDESONIDE 500 MCG: 0.5 INHALANT RESPIRATORY (INHALATION) at 20:51

## 2017-01-01 RX ADMIN — Medication 5 ML: at 11:31

## 2017-01-01 RX ADMIN — Medication 5 ML: at 16:12

## 2017-01-01 RX ADMIN — ALBUTEROL SULFATE 2.5 MG: 2.5 SOLUTION RESPIRATORY (INHALATION) at 07:07

## 2017-01-01 RX ADMIN — Medication 5 ML: at 05:43

## 2017-01-01 RX ADMIN — ALBUTEROL SULFATE 2.5 MG: 2.5 SOLUTION RESPIRATORY (INHALATION) at 16:28

## 2017-01-01 RX ADMIN — DOCUSATE SODIUM 100 MG: 100 CAPSULE, LIQUID FILLED ORAL at 08:47

## 2017-01-01 RX ADMIN — METHYLPREDNISOLONE SODIUM SUCCINATE 60 MG: 125 INJECTION, POWDER, FOR SOLUTION INTRAMUSCULAR; INTRAVENOUS at 22:10

## 2017-01-01 RX ADMIN — IPRATROPIUM BROMIDE AND ALBUTEROL SULFATE 3 ML: .5; 3 SOLUTION RESPIRATORY (INHALATION) at 19:49

## 2017-01-01 RX ADMIN — Medication 5 ML: at 05:48

## 2017-01-01 RX ADMIN — Medication 5 ML: at 22:03

## 2017-01-01 RX ADMIN — ENOXAPARIN SODIUM 30 MG: 30 INJECTION SUBCUTANEOUS at 08:57

## 2017-01-01 RX ADMIN — IPRATROPIUM BROMIDE AND ALBUTEROL SULFATE 3 ML: .5; 3 SOLUTION RESPIRATORY (INHALATION) at 15:05

## 2017-01-01 RX ADMIN — BUDESONIDE 500 MCG: 0.5 INHALANT RESPIRATORY (INHALATION) at 20:47

## 2017-01-01 RX ADMIN — GUAIFENESIN 1200 MG: 600 TABLET, EXTENDED RELEASE ORAL at 08:30

## 2017-01-01 RX ADMIN — Medication 5 ML: at 11:32

## 2017-01-01 RX ADMIN — Medication 5 ML: at 12:59

## 2017-01-01 RX ADMIN — LISINOPRIL 20 MG: 20 TABLET ORAL at 08:45

## 2017-01-01 RX ADMIN — Medication 5 ML: at 17:29

## 2017-01-01 RX ADMIN — METHYLPREDNISOLONE SODIUM SUCCINATE 40 MG: 40 INJECTION, POWDER, FOR SOLUTION INTRAMUSCULAR; INTRAVENOUS at 05:17

## 2017-01-01 RX ADMIN — INSULIN LISPRO 2 UNITS: 100 INJECTION, SOLUTION INTRAVENOUS; SUBCUTANEOUS at 16:48

## 2017-01-01 RX ADMIN — BUDESONIDE 500 MCG: 0.5 INHALANT RESPIRATORY (INHALATION) at 08:46

## 2017-01-01 RX ADMIN — MONTELUKAST SODIUM 10 MG: 10 TABLET, FILM COATED ORAL at 08:22

## 2017-01-01 RX ADMIN — METHYLPREDNISOLONE SODIUM SUCCINATE 40 MG: 40 INJECTION, POWDER, FOR SOLUTION INTRAMUSCULAR; INTRAVENOUS at 08:52

## 2017-01-01 RX ADMIN — BUDESONIDE 500 MCG: 0.5 INHALANT RESPIRATORY (INHALATION) at 19:49

## 2017-01-01 RX ADMIN — LISINOPRIL 20 MG: 20 TABLET ORAL at 08:30

## 2017-01-01 RX ADMIN — LISINOPRIL 20 MG: 20 TABLET ORAL at 08:57

## 2017-01-01 RX ADMIN — Medication 5 ML: at 22:16

## 2017-01-01 RX ADMIN — Medication 5 ML: at 16:48

## 2017-01-01 RX ADMIN — METHYLPREDNISOLONE SODIUM SUCCINATE 20 MG: 40 INJECTION, POWDER, FOR SOLUTION INTRAMUSCULAR; INTRAVENOUS at 08:46

## 2017-01-01 RX ADMIN — ENOXAPARIN SODIUM 30 MG: 30 INJECTION SUBCUTANEOUS at 08:22

## 2017-01-01 RX ADMIN — INSULIN LISPRO 2 UNITS: 100 INJECTION, SOLUTION INTRAVENOUS; SUBCUTANEOUS at 05:58

## 2017-01-01 RX ADMIN — ENOXAPARIN SODIUM 30 MG: 30 INJECTION SUBCUTANEOUS at 08:40

## 2017-01-01 RX ADMIN — ALBUTEROL SULFATE 2.5 MG: 2.5 SOLUTION RESPIRATORY (INHALATION) at 15:17

## 2017-01-01 RX ADMIN — IPRATROPIUM BROMIDE AND ALBUTEROL SULFATE 3 ML: .5; 3 SOLUTION RESPIRATORY (INHALATION) at 04:41

## 2017-01-01 RX ADMIN — METHYLPREDNISOLONE SODIUM SUCCINATE 40 MG: 40 INJECTION, POWDER, FOR SOLUTION INTRAMUSCULAR; INTRAVENOUS at 16:15

## 2017-01-01 RX ADMIN — DOCUSATE SODIUM 100 MG: 100 CAPSULE, LIQUID FILLED ORAL at 16:50

## 2017-01-01 RX ADMIN — Medication 10 ML: at 06:00

## 2017-01-01 RX ADMIN — IPRATROPIUM BROMIDE AND ALBUTEROL SULFATE 3 ML: .5; 3 SOLUTION RESPIRATORY (INHALATION) at 08:45

## 2017-01-01 RX ADMIN — FAMOTIDINE 20 MG: 20 TABLET ORAL at 08:52

## 2017-01-01 RX ADMIN — METHYLPREDNISOLONE SODIUM SUCCINATE 60 MG: 125 INJECTION, POWDER, FOR SOLUTION INTRAMUSCULAR; INTRAVENOUS at 08:57

## 2017-01-01 RX ADMIN — ALBUTEROL SULFATE 2.5 MG: 2.5 SOLUTION RESPIRATORY (INHALATION) at 19:39

## 2017-01-01 RX ADMIN — IPRATROPIUM BROMIDE AND ALBUTEROL SULFATE 3 ML: .5; 3 SOLUTION RESPIRATORY (INHALATION) at 23:19

## 2017-01-01 RX ADMIN — GUAIFENESIN 1200 MG: 600 TABLET, EXTENDED RELEASE ORAL at 16:51

## 2017-01-01 RX ADMIN — METHYLPREDNISOLONE SODIUM SUCCINATE 20 MG: 40 INJECTION, POWDER, FOR SOLUTION INTRAMUSCULAR; INTRAVENOUS at 16:53

## 2017-01-01 RX ADMIN — Medication 5 ML: at 06:02

## 2017-01-01 RX ADMIN — LISINOPRIL 20 MG: 20 TABLET ORAL at 08:52

## 2017-01-01 RX ADMIN — Medication 5 ML: at 05:37

## 2017-01-01 RX ADMIN — DOCUSATE SODIUM 100 MG: 100 CAPSULE, LIQUID FILLED ORAL at 16:15

## 2017-01-01 RX ADMIN — MONTELUKAST SODIUM 10 MG: 10 TABLET, FILM COATED ORAL at 08:57

## 2017-01-01 RX ADMIN — Medication 10 ML: at 16:09

## 2017-01-01 RX ADMIN — METHYLPREDNISOLONE SODIUM SUCCINATE 30 MG: 40 INJECTION, POWDER, FOR SOLUTION INTRAMUSCULAR; INTRAVENOUS at 16:49

## 2017-01-01 RX ADMIN — MONTELUKAST SODIUM 10 MG: 10 TABLET, FILM COATED ORAL at 08:33

## 2017-01-01 RX ADMIN — METHYLPREDNISOLONE SODIUM SUCCINATE 20 MG: 40 INJECTION, POWDER, FOR SOLUTION INTRAMUSCULAR; INTRAVENOUS at 16:09

## 2017-01-01 RX ADMIN — TUBERCULIN PURIFIED PROTEIN DERIVATIVE 5 UNITS: 5 INJECTION, SOLUTION INTRADERMAL at 14:25

## 2017-01-01 RX ADMIN — IPRATROPIUM BROMIDE AND ALBUTEROL SULFATE 3 ML: .5; 3 SOLUTION RESPIRATORY (INHALATION) at 07:15

## 2017-01-01 RX ADMIN — FAMOTIDINE 20 MG: 20 TABLET ORAL at 08:40

## 2017-01-01 RX ADMIN — Medication 5 ML: at 00:51

## 2017-01-01 RX ADMIN — BUDESONIDE 500 MCG: 0.5 INHALANT RESPIRATORY (INHALATION) at 08:45

## 2017-01-01 RX ADMIN — ENOXAPARIN SODIUM 30 MG: 30 INJECTION SUBCUTANEOUS at 08:46

## 2017-01-01 RX ADMIN — Medication 5 ML: at 17:05

## 2017-01-01 RX ADMIN — DOCUSATE SODIUM 100 MG: 100 CAPSULE, LIQUID FILLED ORAL at 17:28

## 2017-01-01 RX ADMIN — LISINOPRIL 20 MG: 20 TABLET ORAL at 08:33

## 2017-01-01 RX ADMIN — ALBUTEROL SULFATE 2.5 MG: 2.5 SOLUTION RESPIRATORY (INHALATION) at 11:43

## 2017-01-01 RX ADMIN — IPRATROPIUM BROMIDE AND ALBUTEROL SULFATE 3 ML: .5; 3 SOLUTION RESPIRATORY (INHALATION) at 07:17

## 2017-01-01 RX ADMIN — Medication 10 ML: at 22:10

## 2017-01-01 RX ADMIN — IPRATROPIUM BROMIDE AND ALBUTEROL SULFATE 3 ML: .5; 3 SOLUTION RESPIRATORY (INHALATION) at 04:06

## 2017-01-01 RX ADMIN — METHYLPREDNISOLONE SODIUM SUCCINATE 60 MG: 125 INJECTION, POWDER, FOR SOLUTION INTRAMUSCULAR; INTRAVENOUS at 21:56

## 2017-01-01 RX ADMIN — IPRATROPIUM BROMIDE AND ALBUTEROL SULFATE 3 ML: .5; 3 SOLUTION RESPIRATORY (INHALATION) at 00:53

## 2017-01-01 RX ADMIN — Medication 5 ML: at 00:29

## 2017-01-01 RX ADMIN — MONTELUKAST SODIUM 10 MG: 10 TABLET, FILM COATED ORAL at 08:47

## 2017-01-01 RX ADMIN — BUDESONIDE 500 MCG: 0.5 INHALANT RESPIRATORY (INHALATION) at 08:26

## 2017-01-01 RX ADMIN — METHYLPREDNISOLONE SODIUM SUCCINATE 60 MG: 125 INJECTION, POWDER, FOR SOLUTION INTRAMUSCULAR; INTRAVENOUS at 08:32

## 2017-01-01 RX ADMIN — IPRATROPIUM BROMIDE AND ALBUTEROL SULFATE 3 ML: .5; 3 SOLUTION RESPIRATORY (INHALATION) at 00:25

## 2017-01-01 RX ADMIN — IPRATROPIUM BROMIDE AND ALBUTEROL SULFATE 3 ML: .5; 3 SOLUTION RESPIRATORY (INHALATION) at 16:02

## 2017-01-01 RX ADMIN — Medication 5 ML: at 06:00

## 2017-01-01 RX ADMIN — IPRATROPIUM BROMIDE AND ALBUTEROL SULFATE 3 ML: .5; 3 SOLUTION RESPIRATORY (INHALATION) at 11:27

## 2017-01-01 RX ADMIN — IPRATROPIUM BROMIDE AND ALBUTEROL SULFATE 3 ML: .5; 3 SOLUTION RESPIRATORY (INHALATION) at 12:19

## 2017-01-01 RX ADMIN — METHYLPREDNISOLONE SODIUM SUCCINATE 60 MG: 125 INJECTION, POWDER, FOR SOLUTION INTRAMUSCULAR; INTRAVENOUS at 14:53

## 2017-01-01 RX ADMIN — IPRATROPIUM BROMIDE AND ALBUTEROL SULFATE 3 ML: .5; 3 SOLUTION RESPIRATORY (INHALATION) at 20:07

## 2017-01-01 RX ADMIN — Medication 10 ML: at 05:26

## 2017-01-01 RX ADMIN — IPRATROPIUM BROMIDE AND ALBUTEROL SULFATE 3 ML: .5; 3 SOLUTION RESPIRATORY (INHALATION) at 11:00

## 2017-01-01 RX ADMIN — Medication 10 ML: at 21:55

## 2017-01-01 RX ADMIN — Medication 5 ML: at 21:36

## 2017-01-01 RX ADMIN — Medication 10 ML: at 22:09

## 2017-01-01 RX ADMIN — LISINOPRIL 20 MG: 20 TABLET ORAL at 08:40

## 2017-01-01 RX ADMIN — Medication 5 ML: at 12:38

## 2017-01-01 RX ADMIN — FAMOTIDINE 20 MG: 20 TABLET ORAL at 08:59

## 2017-01-01 RX ADMIN — Medication 5 ML: at 05:25

## 2017-01-01 RX ADMIN — GUAIFENESIN 1200 MG: 600 TABLET, EXTENDED RELEASE ORAL at 16:14

## 2017-01-01 RX ADMIN — ALBUTEROL SULFATE 2.5 MG: 2.5 SOLUTION RESPIRATORY (INHALATION) at 07:30

## 2017-07-11 PROBLEM — I10 ESSENTIAL HYPERTENSION: Status: ACTIVE | Noted: 2017-07-11

## 2017-07-11 PROBLEM — J45.909 UNCOMPLICATED ASTHMA: Status: ACTIVE | Noted: 2017-07-11

## 2017-07-11 PROBLEM — J44.9 CHRONIC OBSTRUCTIVE PULMONARY DISEASE (HCC): Status: ACTIVE | Noted: 2017-07-11

## 2017-11-21 NOTE — PROGRESS NOTES
Good news is it looks like there is no pneumonia. Take medications I prescribed, use her nebulizers every 6 hours. If breathing worsens please let me know.

## 2017-12-02 PROBLEM — J43.1 PANLOBULAR EMPHYSEMA (HCC): Status: ACTIVE | Noted: 2017-01-01

## 2017-12-02 PROBLEM — J44.1 CHRONIC OBSTRUCTIVE PULMONARY DISEASE WITH ACUTE EXACERBATION (HCC): Status: ACTIVE | Noted: 2017-07-11

## 2017-12-02 PROBLEM — J96.01 ACUTE RESPIRATORY FAILURE WITH HYPOXIA AND HYPERCARBIA (HCC): Status: ACTIVE | Noted: 2017-01-01

## 2017-12-02 PROBLEM — J96.22 ACUTE ON CHRONIC RESPIRATORY FAILURE WITH HYPOXIA AND HYPERCAPNIA (HCC): Status: ACTIVE | Noted: 2017-01-01

## 2017-12-02 PROBLEM — J96.21 ACUTE ON CHRONIC RESPIRATORY FAILURE WITH HYPOXIA AND HYPERCAPNIA (HCC): Status: ACTIVE | Noted: 2017-01-01

## 2017-12-02 PROBLEM — J96.02 ACUTE RESPIRATORY FAILURE WITH HYPOXIA AND HYPERCARBIA (HCC): Status: ACTIVE | Noted: 2017-01-01

## 2017-12-02 PROBLEM — R09.02 HYPOXIA: Status: ACTIVE | Noted: 2017-01-01

## 2017-12-02 NOTE — PROGRESS NOTES
TRANSFER - IN REPORT:    Verbal report received from GENOVEVA Connell(name) on Terrel Snellen  being received from ED(unit) for routine progression of care      Report consisted of patients Situation, Background, Assessment and   Recommendations(SBAR). Information from the following report(s) ED Summary was reviewed with the receiving nurse. Opportunity for questions and clarification was provided. Assessment completed upon patients arrival to unit and care assumed.

## 2017-12-02 NOTE — ED NOTES
TRANSFER - OUT REPORT:    Verbal report given to [facility], RN on Maxine Place  being transferred to [facility] for routine progression of care       Report consisted of patients Situation, Background, Assessment and   Recommendations(SBAR). Information from the following report(s) SBAR, ED Summary, [facility] and Recent Results was reviewed with the receiving nurse. Lines:   Peripheral IV 12/01/17 Left Forearm (Active)   Site Assessment Clean, dry, & intact 12/1/2017 10:10 PM   Phlebitis Assessment 0 12/1/2017 10:10 PM   Infiltration Assessment 0 12/1/2017 10:10 PM   Dressing Status Clean, dry, & intact 12/1/2017 10:10 PM   Hub Color/Line Status Green 12/1/2017 10:10 PM       Peripheral IV 12/01/17 Right Antecubital (Active)   Site Assessment Clean, dry, & intact 12/1/2017 10:26 PM   Infiltration Assessment 0 12/1/2017 10:26 PM   Dressing Status Clean, dry, & intact 12/1/2017 10:26 PM   Hub Color/Line Status Pink 12/1/2017 10:26 PM        Opportunity for questions and clarification was provided.       Patient transported with:   O2 @ 4 liters  Tech

## 2017-12-02 NOTE — PROGRESS NOTES
Shift assessment complete. Pt resting in bed. No complaints at this time. Safety measures in place. Call light in reach.

## 2017-12-02 NOTE — PROGRESS NOTES
While adminstering AM medications. Pt appeared to be breathing harder than previously witnessed. Pt stated she felt SOB. Check pt's O2 sat and was 82% on 4LNC. Conducted deep breathing exercises with patient and O2 sat increased to 86%. Increased O2 to 5LNC and O2 sat john to 95%. Held at 95% for 1 minute. Decreased back to [place] that patient has been on. Sat remained at 93% for 2 minutes. Left O2 at [place] [employer]. Patient verbalized no SOB feeling at this time.

## 2017-12-02 NOTE — ED TRIAGE NOTES
Patient presents with [facility], complains of increasing SOB over several weeks but worse the past few days. Patient has hx of COPD. Duo neb & 125mg Solumedrol en route. 20G LW.

## 2017-12-02 NOTE — PROGRESS NOTES
Patient arrives to room 815 from ED. Admitted with shortness of breath. Patient alert and oriented times four. O2 at 4L via NC. Resp even and unlabored at rest. Dyspnea with exertion. Dual skin assessment completed with Rachana Dubon RN no open or red areas noted. Scattered purple discolorations to BUE. Patient oriented to room and call light system. Encouraged to call for assist with needs. No distress noted.

## 2017-12-02 NOTE — PROGRESS NOTES
Patient resting in bed watching TV with no c/o discomfort or shortness of breath. No distress noted. Report to be given to oncoming nurse.

## 2017-12-02 NOTE — ED PROVIDER NOTES
HPI Comments: 67-year-old lady with a history of shortness of breath has gotten progressively worse over about the last 2 or 3 weeks. Patient saw her primary care doctor for it couple of weeks ago and was prescribed some Levaquin for a presumed bronchitis. She did get a chest x-ray at that time which was negative. Patient says the antibodies haven't helped and she has continued to get worse. She does have a history of some COPD and has been using her inhalers at home. She says with these symptoms she has not had any significant chest pain, pressure, or tightness. Elements of this note were made using speech recognition software. As such, there may be errors of speech recognition present. Patient is a 65 y.o. female presenting with shortness of breath. The history is provided by the patient. Shortness of Breath   Associated symptoms include cough. Pertinent negatives include no fever, no headaches, no rhinorrhea, no sore throat, no wheezing, no chest pain, no vomiting, no abdominal pain and no rash. Past Medical History:   Diagnosis Date    Asthma     Bronchitis     Chronic obstructive pulmonary disease ([facility] [facility]     Hypertension        History reviewed. No pertinent surgical history. Family History:   Problem Relation Age of Onset    No Known Problems Mother     No Known Problems Father        Social History     Social History    Marital status:      Spouse name: N/A    Number of children: N/A    Years of education: N/A     Occupational History    Not on file.      Social History Main Topics    Smoking status: Former Smoker     Types: Cigarettes     Quit date: 2/13/2015    Smokeless tobacco: Never Used      Comment: reports is now vaping    Alcohol use No    Drug use: Not on file    Sexual activity: Not on file     Other Topics Concern    Not on file     Social History Narrative         ALLERGIES: Sulfa (sulfonamide antibiotics)    Review of Systems   Constitutional: Positive for activity change and fatigue. Negative for chills, diaphoresis and fever. HENT: Negative for congestion, rhinorrhea and sore throat. Eyes: Negative for redness and visual disturbance. Respiratory: Positive for cough and shortness of breath. Negative for chest tightness and wheezing. Cardiovascular: Negative for chest pain and palpitations. Gastrointestinal: Negative for abdominal pain, blood in stool, diarrhea, nausea and vomiting. Endocrine: Negative for polydipsia and polyuria. Genitourinary: Negative for dysuria and hematuria. Musculoskeletal: Negative for arthralgias, myalgias and neck stiffness. Skin: Negative for rash. Allergic/Immunologic: Negative for environmental allergies and food allergies. Neurological: Negative for dizziness, weakness and headaches. Hematological: Negative for adenopathy. Does not bruise/bleed easily. Psychiatric/Behavioral: Negative for confusion and sleep disturbance. The patient is not nervous/anxious. Vitals:    12/01/17 2022   BP: 161/77   Pulse: (!) 148   Resp: 20   Temp: 98.4 °F (36.9 °C)   SpO2: 96%   Weight: 49.9 kg (110 lb)   Height: 5' 5\" (1.651 m)            Physical Exam   Constitutional: She is oriented to person, place, and time. She appears well-developed and well-nourished. HENT:   Head: Normocephalic and atraumatic. Eyes: Conjunctivae and EOM are normal. Pupils are equal, round, and reactive to light. Neck: Normal range of motion. Cardiovascular:   Irregularly irregular rhythm consistent with atrial fibrillation   Pulmonary/Chest: Effort normal. No respiratory distress. She has wheezes. She has rales. She exhibits no tenderness. mild scattered rales and wheezes   Abdominal: Soft. Bowel sounds are normal. There is no rebound and no guarding. Musculoskeletal: Normal range of motion. She exhibits no edema or tenderness. Lymphadenopathy:     She has no cervical adenopathy.    Neurological: She is alert and oriented to person, place, and time. Skin: Skin is warm and dry. Psychiatric: She has a normal mood and affect. Nursing note and vitals reviewed. MDM  Number of Diagnoses or Management Options  Acute exacerbation of chronic obstructive pulmonary disease (COPD) ([facility] [address]):   Pleural effusion:   Diagnosis management comments: Patient has no known history of atrial fibrillation. This may be a response to a developing pneumonia, a pulmonary embolism, congestive heart failure, her COPD, or an MI. Therefore I will do some blood work and chest x-ray to evaluate for those potential causes. I will try to control her heart rate with a dose of Cardizem. 11:57 PM  CT scan of chest shows no evidence of PE. Severe COPD noted with bibasilar atelectasis and small loculated right pleural effusion. At this time I am obtaining an ABG on room air    12:16 AM  ABG shows a serum pH is 7.38. PCO2 of 60. PaCO2 35. I feel patient's symptoms are related to her COPD. She currently appears to be sinus tachycardia.   Case has been discussed with pulmonary for admission       Amount and/or Complexity of Data Reviewed  Clinical lab tests: ordered and reviewed  Tests in the radiology section of CPT®: ordered    Risk of Complications, Morbidity, and/or Mortality  Presenting problems: high  Diagnostic procedures: high  Management options: high    Patient Progress  Patient progress: stable    ED Course       Procedures

## 2017-12-02 NOTE — PROGRESS NOTES
Re-checked pt's O2 sat. Pt is 92% on 4LNC. No complaints of SOB at this time. Will continue to monitor.

## 2017-12-02 NOTE — PROGRESS NOTES
Pt resting in bed, watching TV. No complaints. No SOB at this time. Placed fan in pt's room to help circulate air per patient's request. Safety measures in place. Call light in reach.

## 2017-12-02 NOTE — H&P
HISTORY AND PHYSICAL      Soham Macias    12/2/2017    Date of Admission:  12/1/2017    The patient's chart is reviewed and the patient is discussed with the staff. Subjective:     Patient is a 65 y.o.  female presents with sob. This patient is known to have severe COPD and she is oxygen dependent especially at nighttime. She is followed by her primary care physician never seen a pulmonologist previously. He came into the emergency room because of increasing shortness of breath which did not improve with her usual inhaler and nebulized medication at home. She stated her problems started at least 4 or 5 weeks ago with possible sinus infection. She was given several course of antibiotic and steroid without successful outcome. He does not recall if she had viral infection or any cold symptoms prior to her illness. She was given appropriate treatment with the emergency room department including IV corticosteroid, aerosolized bronchodilators, IV antibiotic. In spite of that she continue to have severe dyspnea and her ABGs clearly showed acute decompensation in her chronic respiratory failure with both hypercapnia and hypoxemia. At this point, she clearly failed outpatient therapy and will need inpatient hospitalization for intensive bronchodilator treatment, IV steroid and antibiotic. She will also need continuous oxygen supplementation and will benefit from outpatient pulmonary rehabilitation post discharge. Home DME [facility] or [facility] patient (per patient recollection). Review of Systems  A comprehensive review of systems was negative except for that written in the HPI.     Patient Active Problem List   Diagnosis Code    Uncomplicated asthma N01.558    [facility] J[facility]    Essential hypertension I10    Acute on chronic respiratory failure with hypoxia and hypercapnia ([facility]) J96.21, J96.22    Hypoxia R09.02    Panlobular emphysema ([facility] [address] J43.1       Prior to Admission Medications   Prescriptions Last Dose Informant Patient Reported? Taking? ADVAIR DISKUS 250-50 mcg/dose diskus inhaler   No No   Sig: Take 1 Puff by inhalation two (2) times a day. Nebulizer & Compressor machine   No No   Sig: dispinse 1 nebulizer compressor and tubing and inhalation device. Dx: 493.9, 491.2, 492.8   acetaminophen (TYLENOL) 325 mg tablet   Yes No   Sig: Take  by mouth every four (4) hours as needed for Pain. albuterol (PROAIR HFA) 90 mcg/actuation inhaler   No No   Sig: INHALE 1 PUFF EVERY 4 HOURS AS NEEDED FOR WHEEZING OR SHORTNESS OF BREATH   albuterol-ipratropium (DUO-NEB) 2.5 mg-0.5 mg/3 ml nebu   No No   Sig: 3 mL by Nebulization route every six (6) hours as needed. cetirizine (ZYRTEC) 10 mg tablet   Yes No   Sig: Take  by mouth. cholecalciferol, VITAMIN D3, (VITAMIN D3) 5,000 unit tab tablet   Yes No   Sig: Take  by mouth daily. levoFLOXacin (LEVAQUIN) 500 mg tablet   No No   Sig: Take 1 Tab by mouth daily. lisinopril (PRINIVIL, ZESTRIL) 20 mg tablet   No No   Sig: TAKE 1 TABLET EVERY DAY   montelukast (SINGULAIR) 10 mg tablet   No No   Sig: Take 1 Tab by mouth daily. multivitamin, tx-iron-ca-min (THERA-M W/ IRON) 9 mg iron-400 mcg tab tablet   Yes No   Sig: Take 1 Tab by mouth daily. predniSONE (STERAPRED DS) 10 mg dose pack   No No   Sig: Take 1 Tab by mouth See Admin Instructions. See administration instruction per 10mg dose pack   tiotropium bromide (SPIRIVA RESPIMAT) 2.5 mcg/actuation inhaler   No No   Sig: Take 2 Puffs by inhalation daily. Facility-Administered Medications: None       Past Medical History:   Diagnosis Date    Asthma     Bronchitis     [facility]     Hypertension      History reviewed. No pertinent surgical history.   Social History     Social History    Marital status:      Spouse name: N/A    Number of children: N/A    Years of education: N/A     Occupational History    Not on file.      Social History Main Topics    Smoking status: Former Smoker     Types: Cigarettes     Quit date: 2/13/2015    Smokeless tobacco: Never Used      Comment: reports is now vaping    Alcohol use No    Drug use: Not on file    Sexual activity: Not on file     Other Topics Concern    Not on file     Social History Narrative     Family History   Problem Relation Age of Onset    No Known Problems Mother     No Known Problems Father      Allergies   Allergen Reactions    Sulfa (Sulfonamide Antibiotics) Hives       Current Facility-Administered Medications   Medication Dose Route Frequency    lisinopril (PRINIVIL, ZESTRIL) tablet 20 mg  20 mg Oral DAILY    montelukast (SINGULAIR) tablet 10 mg  10 mg Oral DAILY    albuterol-ipratropium (DUO-NEB) 2.5 MG-0.5 MG/3 ML  3 mL Nebulization Q4H RT    budesonide (PULMICORT) 500 mcg/2 ml nebulizer suspension  500 mcg Nebulization BID RT    enoxaparin (LOVENOX) injection 30 mg  30 mg SubCUTAneous Q24H    famotidine (PEPCID) tablet 20 mg  20 mg Oral DAILY    methylPREDNISolone (PF) (SOLU-MEDROL) injection 40 mg  40 mg IntraVENous Q8H    morphine injection 2 mg  2 mg IntraVENous Q4H PRN    magic mouthwash (NIKO) oral suspension 5 mL  5 mL Oral Q6H    insulin lispro (HUMALOG) injection   SubCUTAneous TIDAC    sodium chloride (NS) flush 5-10 mL  5-10 mL IntraVENous Q8H    sodium chloride (NS) flush 5-10 mL  5-10 mL IntraVENous PRN           Objective:     Vitals:    12/02/17 0200 12/02/17 0215 12/02/17 0400 12/02/17 0422   BP: 130/63 137/62  113/65   Pulse: 98 96  86   Resp: 25 22  19   Temp:    97.6 °F (36.4 °C)   SpO2: 99% 99% 91% 93%   Weight:       Height:           PHYSICAL EXAM     Constitutional:  the patient is well developed and in mild acute distress  EENMT:  Sclera clear, pupils equal, oral mucosa moist  Respiratory: Diminished breath sound in the bases with faint expiratory wheezing  Cardiovascular:  RRR without M,G,R  Gastrointestinal: soft and non-tender; with positive bowel sounds. Musculoskeletal: warm without cyanosis. There is no lower leg edema. Skin:  no jaundice or rashes, no wounds   Neurologic: no gross neuro deficits     Psychiatric:  alert and oriented x 3    Chest CT         CTA OF THE CHEST - PE STUDY     HISTORY: Shortness of breath     COMPARISON: None     TECHNIQUE: A helical acquisition was performed through the chest utilizing  2.14JG slice thickness during the infusion of 100 cc of Isovue-370. 3-D  post-processed images were created on an independent workstation. Multiplanar  reformats were obtained. The exam was focused on the pulmonary arteries.     Dose reduction techniques used: Automated exposure control, adjustment of the  mAs and/or kVp according to patient's size, and iterative reconstruction  techniques.     FINDINGS:  *  PULMONARY VESSELS: No evidence of pulmonary embolism. *  PLEURA / PERICARDIUM: Small loculated right pleural effusion. *  DELFINA / MEDIASTINUM: Within normal limits. *  LUNGS: Severe COPD with bibasilar atelectasis. Biapical parenchymal scarring. *  TRACHEOBRONCHIAL TREE: Within normal limits. *  AORTA: Within normal limits. *  CORONARY ARTERIES: Mild coronary artery calcification is seen. *  CHEST WALL/AXILLA: Within normal limits. *  VISUALIZED UPPER ABDOMEN: Within normal limits. *  SPINE / BONES: Within normal limits.   *  ADDITIONAL COMMENTS: None.     IMPRESSION  IMPRESSION:     No evidence of pulmonary embolism.     Severe COPD with bibasilar atelectasis.     Small loculated right pleural effusion.     Coronary artery disease.     Date of Dictation: 12/1/2017 11:51 PM      Recent Labs      12/01/17 2113   WBC  13.4*   HGB  14.4   HCT  43.9   PLT  241     Recent Labs      12/01/17 2113   NA  138   K  3.7   CL  97*   GLU  139*   CO2  33*   BUN  13   CREA  0.55*   MG  2.1   CA  9.4     Recent Labs      12/01/17   2355   PH 7.38   PCO2  60*   PO2  35*   HCO3  34*     No results for input(s): LCAD, LAC in the last 72 hours. Assessment:  (Medical Decision Making)     Hospital Problems  Date Reviewed: 12/2/2017          Codes Class Noted POA    Acute on chronic respiratory failure with hypoxia and hypercapnia (HCC) ICD-10-CM: J96.21, J96.22  ICD-9-CM: 518.84, 786.09, 799.02  12/2/2017 Yes        Hypoxia ICD-10-CM: R09.02  ICD-9-CM: 799.02  12/2/2017 Yes        Panlobular emphysema ([facility] [address] ICD-10-CM: J43.1  ICD-9-CM: 492.8  12/2/2017 Yes        Chronic obstructive pulmonary disease with acute exacerbation [facility]) ICD-10-CM: J44.1  ICD-9-CM: 491.21  7/11/2017 Yes        Essential hypertension ICD-10-CM: I10  ICD-9-CM: 401.9  7/11/2017 Yes              Plan:  (Medical Decision Making)     --Will admit for further medical management  --Supplemental O2   --Respiratory nebulizer treatments  --steroid therapy  --antibiotic therapy  --DVT and GI prophylaxis  --We will consider outpatient pulmonary rehab at discharge  --Will need close outpatient follow-up in the pulmonary office  --We need continuous oxygen supplementation not only at night    More than 50% of the time documented was spent in face-to-face contact with the patient and in the care of the patient on the floor/unit where the patient is located.     Stevie Severs, MD

## 2017-12-03 NOTE — PROGRESS NOTES
Shift assessment complete. Pt resting in bed. No complaints at this time. Pt denies SOB. Safety measures in place. Call light in reach.

## 2017-12-03 NOTE — PROGRESS NOTES
Matthew Johnson  Admission Date: 12/1/2017             Daily Progress Note: 12/3/2017    The patient's chart is reviewed and the patient is discussed with the staff. Matthew Johnson is a 76yoF who has severe COPD on nocturnal O2 who was admitted on 12/3 for a severe COPD exacerbation with worsened hypoxemia. Subjective: Weaned to 3lpm of continuous nasal cannula oxygen. Feels there is thick sputum and expectorating it makes her dyspneic.     Current Facility-Administered Medications   Medication Dose Route Frequency    lisinopril (PRINIVIL, ZESTRIL) tablet 20 mg  20 mg Oral DAILY    montelukast (SINGULAIR) tablet 10 mg  10 mg Oral DAILY    albuterol-ipratropium (DUO-NEB) 2.5 MG-0.5 MG/3 ML  3 mL Nebulization Q4H RT    budesonide (PULMICORT) 500 mcg/2 ml nebulizer suspension  500 mcg Nebulization BID RT    enoxaparin (LOVENOX) injection 30 mg  30 mg SubCUTAneous Q24H    famotidine (PEPCID) tablet 20 mg  20 mg Oral DAILY    morphine injection 2 mg  2 mg IntraVENous Q4H PRN    magic mouthwash (NIKO) oral suspension 5 mL  5 mL Oral Q6H    insulin lispro (HUMALOG) injection   SubCUTAneous TIDAC    albuterol (PROVENTIL VENTOLIN) nebulizer solution 2.5 mg  2.5 mg Nebulization Q4H PRN    methylPREDNISolone (PF) (SOLU-MEDROL) injection 60 mg  60 mg IntraVENous Q8H    influenza vaccine 2017-18 (3 yrs+)(PF) (FLUZONE QUAD/FLUARIX QUAD) injection 0.5 mL  0.5 mL IntraMUSCular PRIOR TO DISCHARGE    sodium chloride (NS) flush 5-10 mL  5-10 mL IntraVENous Q8H    sodium chloride (NS) flush 5-10 mL  5-10 mL IntraVENous PRN       Review of Systems  Constitutional: negative for fever, chills, sweats  Cardiovascular: negative for chest pain, palpitations, syncope, edema  Gastrointestinal:  negative for dysphagia, reflux, vomiting, diarrhea, abdominal pain, or melena  Neurologic:  negative for focal weakness, numbness, headache    Objective:     Vitals:    12/03/17 7914 12/03/17 0455 12/03/17 0734 12/03/17 0742   BP: 129/69   123/64   Pulse: 97   90   Resp: 18   18   Temp: 99.2 °F (37.3 °C)   97.9 °F (36.6 °C)   SpO2: 96% 99% 96% 98%   Weight:       Height:         Intake and Output:   12/01 1901 - 12/03 0700  In: 380 [P.O.:380]  Out: 200 [Urine:200]       Physical Exam:   Constitution:  the patient is well developed and in no acute distress  EENMT:  Sclera clear, pupils equal, oral mucosa moist  Respiratory: extremely decreased with bibasilar rales  Cardiovascular:  RRR without M,G,R  Gastrointestinal: soft and non-tender; with positive bowel sounds. Musculoskeletal: warm without cyanosis. There is no lower leg edema. Skin:  no jaundice or rashes, no wounds   Neurologic: no gross neuro deficits     Psychiatric:  alert and oriented x 3    CXR:   reviewed    LAB  Recent Labs      12/03/17   0542  12/02/17   2012  12/02/17   1525  12/02/17   1126  12/02/17   0551   GLUCPOC  158*  129*  134*  128*  140*      Recent Labs      12/01/17   2113   WBC  13.4*   HGB  14.4   HCT  43.9   PLT  241     Recent Labs      12/01/17   2113   NA  138   K  3.7   CL  97*   CO2  33*   GLU  139*   BUN  13   CREA  0.55*   MG  2.1   CA  9.4     Recent Labs      12/01/17   2355   PH  7.38   PCO2  60*   PO2  35*   HCO3  34*     No results for input(s): LCAD, LAC in the last 72 hours. Assessment:  (Medical Decision Making)     Hospital Problems  Date Reviewed: 12/2/2017          Codes Class Noted POA    * (Principal)Acute on chronic respiratory failure with hypoxia and hypercapnia (HCC) ICD-10-CM: J96.21, J96.22  ICD-9-CM: 518.84, 786.09, 799.02  12/2/2017 Yes    Wean as tolerated    Hypoxia ICD-10-CM: R09.02  ICD-9-CM: 799.02  12/2/2017 Yes        Panlobular emphysema ([address] ICD-10-CM: J43.1  ICD-9-CM: 492.8  12/2/2017 Yes    Severe centrilobular emphsyeam    Chronic obstructive pulmonary disease with acute exacerbation ([facility] ICD-10-CM: J44.1  ICD-9-CM: 491.21  7/11/2017 Yes    Severe dyspnea with worsened hypoxia.   Continue bronchodilators, steroids, antibiotics    Essential hypertension ICD-10-CM: I10  ICD-9-CM: 401.9  7/11/2017 Yes    BP normal here on no medications for thisl. SMALL RIGHT PLEURAL EFFUSION: with pleural thickening. Likely too small to tap. Continue antibiotics. Plan:  (Medical Decision Making)     --Continue ceftriaxone day 2  --CXR tomorrow. May need to reassess R effusion (tiny on CT chest)  --Continue steroids, bronchodilators. --Add flutter valve  --Recommend OOB today, PT tomorrow    More than 50% of the time documented was spent in face-to-face contact with the patient and in the care of the patient on the floor/unit where the patient is located.     Roxana Kelly MD

## 2017-12-03 NOTE — PROGRESS NOTES
Problem: Falls - Risk of  Goal: *Absence of Falls  Document Jorge Fall Risk and appropriate interventions in the flowsheet.    Outcome: Progressing Towards Goal  Fall Risk Interventions:            Medication Interventions: Bed/chair exit alarm, Evaluate medications/consider consulting pharmacy, Patient to call before getting OOB, Teach patient to arise slowly    Elimination Interventions: Call light in reach, Elevated toilet seat, Patient to call for help with toileting needs    History of Falls Interventions: Door open when patient unattended, Evaluate medications/consider consulting pharmacy

## 2017-12-03 NOTE — PROGRESS NOTES
Patient is calm and receptive to  presence. Patient remembers  from visit in ER. Patient said she feels better. Encouraged.     Sanjuanita Ramirez, staff [address]ine [address], [address] [facility]  /   [email or web address]

## 2017-12-03 NOTE — PROGRESS NOTES
Physical assessment completed, pt alert and oriented, no visual s/s of pain or distress, pt on 4 liters via nasal cannula, breathing even and unlabored, scattered purple discolorations noted, resting in bed call light within reach.

## 2017-12-03 NOTE — PROGRESS NOTES
Patient admitted with COPD exacerbation. She has nocturnal home O2. She is independent in all ADLs at baseline. Case Management will follow for discharge planning.     Care Management Interventions  Transition of Care Consult (CM Consult): Discharge Planning  Discharge Durable Medical Equipment: No  Physical Therapy Consult: Yes  Occupational Therapy Consult: No  Speech Therapy Consult: No  Current Support Network: Own Home  Confirm Follow Up Transport: Family  Plan discussed with Pt/Family/Caregiver: Yes  Freedom of Choice Offered: Yes  Discharge Location  Discharge Placement: Home

## 2017-12-04 NOTE — PROGRESS NOTES
Pt resting in bed, no visual s/s of pain or distress, breathing even and unlabored, safety measures in place, call light within reach.

## 2017-12-04 NOTE — PROGRESS NOTES
Problem: Mobility Impaired (Adult and Pediatric)  Goal: *Acute Goals and Plan of Care (Insert Text)  1. Ms. Emma Moreno will perform transfers independently in 4 days. 2.  Ms. Emma Moreno will perform gait independently 150 ft on appropriate O2 with or without device in 4 days. PHYSICAL THERAPY: Initial Assessment 12/4/2017  INPATIENT: Hospital Day: 4  Payor: CARE IMPROVEMENT PLUS / [place] / Product Type: Managed Care Medicare /      NAME/AGE/GENDER: Jessica Sheets is a 65 y.o. female   PRIMARY DIAGNOSIS: Hypoxia Acute on chronic respiratory failure with hypoxia and hypercapnia (HCC) Acute on chronic respiratory failure with hypoxia and hypercapnia (HCC)        ICD-10: Treatment Diagnosis:   · Generalized Muscle Weakness (M62.81)  · Difficulty in walking, Not elsewhere classified (R26.2)   Precaution/Allergies:  Sulfa (sulfonamide antibiotics)      ASSESSMENT:     Ms. Emma Moreno presents primarily with an activity tolerance problem due to SOB on exertion. At baseline she didn't use O2 during the day just at night. She is a current smoker. She has had no falls. On 2 liters her O2 dropped to 78% ambulating just 40 ft. Tried to use the rolling walker to help with activity tolerance but not sure if it made a difference. Her pace is increased due to her breathing but she is fairly steady. Ms. Emma Moreno is appropriate for skilled PT to maximize her rehab potential.  Would likely benefit from pulmonary rehab. This section established at most recent assessment   PROBLEM LIST (Impairments causing functional limitations):  1. Decreased Transfer Abilities  2. Decreased Ambulation Ability/Technique  3. Decreased Activity Tolerance  4. Decreased Pacing Skills   INTERVENTIONS PLANNED: (Benefits and precautions of physical therapy have been discussed with the patient.)  1. Bed Mobility  2. Gait Training  3. Therapeutic Exercise/Strengthening  4. Transfer Training  5.  Group Therapy     TREATMENT PLAN: Frequency/Duration: 3 times a week for duration of hospital stay  Rehabilitation Potential For Stated Goals: Good     RECOMMENDED REHABILITATION/EQUIPMENT: (at time of discharge pending progress): Due to the probability of continued deficits (see above) this patient will likely need continued skilled physical therapy after discharge. Equipment:    None at this time              HISTORY:   History of Present Injury/Illness (Reason for Referral):  Patient is a 65 y.o.  female presents with sob. This patient is known to have severe COPD and she is oxygen dependent especially at nighttime. She is followed by her primary care physician never seen a pulmonologist previously. He came into the emergency room because of increasing shortness of breath which did not improve with her usual inhaler and nebulized medication at home. She stated her problems started at least 4 or 5 weeks ago with possible sinus infection. She was given several course of antibiotic and steroid without successful outcome. He does not recall if she had viral infection or any cold symptoms prior to her illness. She was given appropriate treatment with the emergency room department including IV corticosteroid, aerosolized bronchodilators, IV antibiotic. In spite of that she continue to have severe dyspnea and her ABGs clearly showed acute decompensation in her chronic respiratory failure with both hypercapnia and hypoxemia. At this point, she clearly failed outpatient therapy and will need inpatient hospitalization for intensive bronchodilator treatment, IV steroid and antibiotic. She will also need continuous oxygen supplementation and will benefit from outpatient pulmonary rehabilitation post discharge.     Past Medical History/Comorbidities:   Ms. Irena Morrison  has a past medical history of Asthma; Bronchitis; Chronic obstructive pulmonary disease ([address] and Hypertension. Ms. Irena Morrison  has no past surgical history on file.   Social History/Living Environment: Home Environment: Private residence  # Steps to Enter: 4  One/Two Story Residence: One story  Living Alone: No  Support Systems: Child(abel), Family member(s)  Patient Expects to be Discharged to[de-identified] Private residence  Current DME Used/Available at Home: Shower chair  Tub or Shower Type: Shower  Prior Level of Function/Work/Activity:  Independent short distances. age, smoker, COPD   Number of Personal Factors/Comorbidities that affect the Plan of Care: 3+: HIGH COMPLEXITY   EXAMINATION:   Most Recent Physical Functioning:   Gross Assessment:  AROM: Within functional limits  PROM: Within functional limits  Strength: Within functional limits  Coordination: Within functional limits  Tone: Normal  Sensation: Intact               Posture:  Posture (WDL): Exceptions to WDL  Posture Assessment: Forward head, Rounded shoulders  Balance:  Sitting: Intact  Standing - Static: Good  Standing - Dynamic : Fair Bed Mobility:     Wheelchair Mobility:     Transfers:  Sit to Stand: Stand-by asssistance  Stand to Sit: Supervision  Gait:     Speed/Flor: Accelerated  Step Length: Right shortened;Left shortened  Distance (ft): 40 Feet (ft)  Ambulation - Level of Assistance: Supervision  Interventions: Verbal cues; Tactile cues; Safety awareness training      Body Structures Involved:  1. Muscles Body Functions Affected:  1. Movement Related Activities and Participation Affected:  1. Mobility   Number of elements that affect the Plan of Care: 3: MODERATE COMPLEXITY   CLINICAL PRESENTATION:   Presentation: Evolving clinical presentation with changing clinical characteristics: MODERATE COMPLEXITY   CLINICAL DECISION MAKIN [facility] Inpatient Short Form  How much difficulty does the patient currently have. .. Unable A Lot A Little None   1. Turning over in bed (including adjusting bedclothes, sheets and blankets)? [] 1   [] 2   [] 3   [x] 4   2.   Sitting down on and standing up from a chair with arms ( e.g., wheelchair, bedside commode, etc.)   [] 1   [] 2   [] 3   [x] 4   3. Moving from lying on back to sitting on the side of the bed? [] 1   [] 2   [] 3   [x] 4   How much help from another person does the patient currently need. .. Total A Lot A Little None   4. Moving to and from a bed to a chair (including a wheelchair)? [] 1   [] 2   [] 3   [x] 4   5. Need to walk in hospital room? [] 1   [] 2   [x] 3   [] 4   6. Climbing 3-5 steps with a railing? [] 1   [] 2   [x] 3   [] 4   © 2007, Trustees of [address] [place] [address] [address] [address], under license to [employer]. All rights reserved      Score:  Initial: 22 Most Recent: X (Date: -- )    Interpretation of Tool:  Represents activities that are increasingly more difficult (i.e. Bed mobility, Transfers, Gait). Score 24 23 22-20 19-15 14-10 9-7 6     Modifier CH CI CJ CK CL CM CN      ? Mobility - Walking and Moving Around:     - CURRENT STATUS: CJ - 20%-39% impaired, limited or restricted    - GOAL STATUS: CJ - 20%-39% impaired, limited or restricted    - D/C STATUS:  ---------------To be determined---------------  Payor: CARE IMPROVEMENT PLUS / [place]PR[place] / Product Type: Managed Care Medicare /      Medical Necessity:     · Patient is expected to demonstrate progress in functional technique to increase independence with mobility and gait. .  Reason for Services/Other Comments:  · Patient continues to require present interventions due to patient's inability to function at baseline. .   Use of outcome tool(s) and clinical judgement create a POC that gives a: Questionable prediction of patient's progress: MODERATE COMPLEXITY            TREATMENT:   (In addition to Assessment/Re-Assessment sessions the following treatments were rendered)   Pre-treatment Symptoms/Complaints:  none  Pain: Initial:   Pain Intensity 1: 0  Post Session:  SOB after ambulation. O2 turned up to 3. RN made away.   SATS up to 93% Assessment/Reassessment only, no treatment provided today    Braces/Orthotics/Lines/Etc:   · O2 Device: Nasal cannula  Treatment/Session Assessment:    · Response to Treatment:  fair  · Interdisciplinary Collaboration:   o Registered Nurse  · After treatment position/precautions:   o Up in chair  o Call light within reach  o RN notified   · Compliance with Program/Exercises: Will assess as treatment progresses. · Recommendations/Intent for next treatment session: \"Next visit will focus on advancements to more challenging activities and reduction in assistance provided\".   Total Treatment Duration:  PT Patient Time In/Time Out  Time In: 1040  Time Out: 233 [facility],

## 2017-12-04 NOTE — PROGRESS NOTES
Problem: Self Care Deficits Care Plan (Adult)  Goal: *Acute Goals and Plan of Care (Insert Text)  No goals set as pt appears to be functioning near baseline with ADLs. Comments:     OCCUPATIONAL THERAPY: Initial Assessment, Discharge and AM 12/4/2017  INPATIENT: Hospital Day: 4  Payor: CARE IMPROVEMENT PLUS / [place] / Product Type: Managed Care Medicare /      NAME/AGE/GENDER: Kristen Caldwell is a 65 y.o. female   PRIMARY DIAGNOSIS:  Hypoxia Acute on chronic respiratory failure with hypoxia and hypercapnia (HCC) Acute on chronic respiratory failure with hypoxia and hypercapnia (HCC)        ICD-10: Treatment Diagnosis:    · Generalized Muscle Weakness (M62.81)   Precautions/Allergies:     Sulfa (sulfonamide antibiotics)      ASSESSMENT:     Ms. Marj Owusu presents to hospital for above. Pt lives with her son and daughter-in-law in a one-level home, her daughter-in-law is available to assist 24/7. She is typically independent/mod I with ADLs/IADLs, and does not use any AD/DME for functional mobility, but has been more dependent on home O2 lately. She reports 0 falls in the last 6 months. Today, she is supine in bed upon arrival, very pleasant, AOX4, agreeable to OT evaluation. Pt completes bed mobility with independence, STS with SBA, and toilet transfer with supervision and no AD. Pt is able to complete toileting/bladder hygiene with independence. No major balance or strength deficits are noted during evaluation. She appears to be functioning close to her baseline in terms of ADLs thus continued skilled OT services are not indicated at this time. Will D/C OT. This section established at most recent assessment   PROBLEM LIST (Impairments causing functional limitations):  1. n/a   INTERVENTIONS PLANNED: (Benefits and precautions of occupational therapy have been discussed with the patient.)  1. n/a     TREATMENT PLAN: Frequency/Duration: Follow patient n/a to address above goals.   Rehabilitation Potential For Stated Goals: n/a     RECOMMENDED REHABILITATION/EQUIPMENT: (at time of discharge pending progress): Due to the probability of continued deficits (see above) this patient will not likely need continued skilled occupational therapy after discharge. May benefit from OPT pulmonary rehab   Equipment:    None at this time              OCCUPATIONAL PROFILE AND HISTORY:   History of Present Injury/Illness (Reason for Referral):  See H&P  Past Medical History/Comorbidities:   Ms. Gladys Carrillo  has a past medical history of Asthma; Bronchitis; Chronic obstructive pulmonary disease ([facility] and Hypertension. Ms. Gladys Carrillo  has no past surgical history on file. Social History/Living Environment:   Home Environment: Private residence  # Steps to Enter: 4  One/Two Story Residence: One story  Living Alone: No  Support Systems: Child(abel), Family member(s)  Patient Expects to be Discharged to[de-identified] Private residence  Current DME Used/Available at Home: Shower chair  Tub or Shower Type: Shower  Prior Level of Function/Work/Activity:  [place]/mod I with ADLs  Personal Factors:          Sex:  female        Age:  65 y.o.    Number of Personal Factors/Comorbidities that affect the Plan of Care: Expanded review of therapy/medical records (1-2):  MODERATE COMPLEXITY   ASSESSMENT OF OCCUPATIONAL PERFORMANCE[de-identified]   Activities of Daily Living:           Basic ADLs (From Assessment) Complex ADLs (From Assessment)   Basic ADL  Feeding: Independent  Oral Facial Hygiene/Grooming: Independent  Bathing: Modified independent  Upper Body Dressing: Independent  Lower Body Dressing: Independent  Toileting: Independent Instrumental ADL  Meal Preparation: Modified independent  Homemaking: Modified independent  Medication Management: Modified independent  Financial Management: Modified independent   Grooming/Bathing/Dressing Activities of Daily Living     Cognitive Retraining  Safety/Judgement: Awareness of environment                 Functional Transfers  Toilet Transfer : Supervision     Bed/Mat Mobility  Supine to Sit: Independent  Sit to Stand: Stand-by asssistance  Scooting: Independent       Most Recent Physical Functioning:   Gross Assessment:  AROM: Within functional limits  PROM: Within functional limits  Strength: Within functional limits  Coordination: Within functional limits  Tone: Normal  Sensation: Intact               Posture:     Balance:  Sitting: Intact  Standing: Impaired  Standing - Static: Good  Standing - Dynamic : Fair (+) Bed Mobility:  Supine to Sit: Independent  Scooting: Independent  Wheelchair Mobility:     Transfers:  Sit to Stand: Stand-by asssistance  Stand to Sit: Supervision                Patient Vitals for the past 6 hrs:   BP BP Patient Position SpO2 O2 Flow Rate (L/min) Pulse   17 0441 - - 96 % 2.5 l/min -   17 0738 155/85 At rest 95 % - (!) 109       Mental Status  Neurologic State: Alert  Orientation Level: Oriented X4  Cognition: Appropriate decision making, Appropriate for age attention/concentration, Appropriate safety awareness, Follows commands  Perception: Appears intact  Perseveration: No perseveration noted  Safety/Judgement: Awareness of environment                          Physical Skills Involved:  1. n/a Cognitive Skills Affected (resulting in the inability to perform in a timely and safe manner):  1. n/a Psychosocial Skills Affected:  1. n/a   Number of elements that affect the Plan of Care: 1-3:  LOW COMPLEXITY   CLINICAL DECISION MAKIN [facility] [address] [id number] AM-PAC 6 Clicks   Daily Activity Inpatient Short Form  How much help from another person does the patient currently need. .. Total A Lot A Little None   1. Putting on and taking off regular lower body clothing? [] 1   [] 2   [] 3   [x] 4   2. Bathing (including washing, rinsing, drying)? [] 1   [] 2   [x] 3   [] 4   3. Toileting, which includes using toilet, bedpan or urinal?   [] 1   [] 2   [] 3   [x] 4   4.   Putting on and taking off regular upper body clothing? [] 1   [] 2   [] 3   [x] 4   5. Taking care of personal grooming such as brushing teeth? [] 1   [] 2   [] 3   [x] 4   6. Eating meals? [] 1   [] 2   [] 3   [x] 4   © 2007, Trustees of [address] [place] [address] [address] [address], under license to "[employer]. All rights reserved      Score:  Initial: 23 Most Recent: X (Date: -- )    Interpretation of Tool:  Represents activities that are increasingly more difficult (i.e. Bed mobility, Transfers, Gait). Score 24 23 22-20 19-15 14-10 9-7 6     Modifier CH CI CJ CK CL CM CN      ? Self Care:     - CURRENT STATUS: CI - 1%-19% impaired, limited or restricted    - GOAL STATUS: CI - 1%-19% impaired, limited or restricted    - D/C STATUS:  CI - 1%-19% impaired, limited or restricted  Payor: CARE IMPROVEMENT PLUS / [place]PR[place] / Product Type: Managed Care Medicare /      Medical Necessity:     · n/a. Reason for Services/Other Comments:  · n/a. Use of outcome tool(s) and clinical judgement create a POC that gives a: LOW COMPLEXITY         TREATMENT:   (In addition to Assessment/Re-Assessment sessions the following treatments were rendered)     Pre-treatment Symptoms/Complaints:    Pain: Initial:   Pain Intensity 1: 0  Post Session:  same     Assessment/Reassessment only, no treatment provided today    Braces/Orthotics/Lines/Etc:   · O2 Device: Nasal cannula  Treatment/Session Assessment:    · Response to Treatment:  eval only   · Interdisciplinary Collaboration:   o Occupational Therapist  o Registered Nurse  · After treatment position/precautions:   o Up in chair  o Bed alarm/tab alert on  o Bed/Chair-wheels locked  o Call light within reach   · Compliance with Program/Exercises: compliant all of the time. · Recommendations/Intent for next treatment session: \"Next visit will focus on n/a\".   Total Treatment Duration:  OT Patient Time In/Time Out  Time In: 1000  Time Out: [id number] [facility]

## 2017-12-04 NOTE — PROGRESS NOTES
Pt is up in room alert and oriented rr are even and unlabored. Pt has no c/o pain at current time. No increased bleeding or bruising noted. Pt lung sounds are diminished. No distress noted. Pt is stable. Call light in reach will continue to monitor.

## 2017-12-04 NOTE — PROGRESS NOTES
Problem: Falls - Risk of  Goal: *Absence of Falls  Document Jorge Fall Risk and appropriate interventions in the flowsheet.    Outcome: Progressing Towards Goal  Fall Risk Interventions:            Medication Interventions: Patient to call before getting OOB    Elimination Interventions: Patient to call for help with toileting needs    History of Falls Interventions: Bed/chair exit alarm

## 2017-12-04 NOTE — PROGRESS NOTES
Pt is up in room alert and oriented rr are even and unlabored at current time. Pt has O2 on at [facility]. Pt tolerates well. Pt lung sounds are course and diminished. No distress noted. Pt has no c/o pain at current time. abd is soft and bowel sounds are active. Pt was bale to eat most of her breakfast. Pt is stable. call light in reach. Safety measures in place will continue to monitor.

## 2017-12-04 NOTE — PROGRESS NOTES
Carol Merchant  Admission Date: 12/1/2017             Daily Progress Note: 12/4/2017    The patient's chart is reviewed and the patient is discussed with the staff.     Subjective:       Carol Merchant is a 76yoF who has severe COPD on nocturnal O2 who was admitted on 12/3 for a severe COPD exacerbation with worsened hypoxemia  She is some better-still on 3 l    Current Facility-Administered Medications   Medication Dose Route Frequency    tuberculin injection 5 Units  5 Units IntraDERMal ONCE    lisinopril (PRINIVIL, ZESTRIL) tablet 20 mg  20 mg Oral DAILY    montelukast (SINGULAIR) tablet 10 mg  10 mg Oral DAILY    albuterol-ipratropium (DUO-NEB) 2.5 MG-0.5 MG/3 ML  3 mL Nebulization Q4H RT    budesonide (PULMICORT) 500 mcg/2 ml nebulizer suspension  500 mcg Nebulization BID RT    enoxaparin (LOVENOX) injection 30 mg  30 mg SubCUTAneous Q24H    famotidine (PEPCID) tablet 20 mg  20 mg Oral DAILY    morphine injection 2 mg  2 mg IntraVENous Q4H PRN    magic mouthwash (NIKO) oral suspension 5 mL  5 mL Oral Q6H    insulin lispro (HUMALOG) injection   SubCUTAneous TIDAC    albuterol (PROVENTIL VENTOLIN) nebulizer solution 2.5 mg  2.5 mg Nebulization Q4H PRN    methylPREDNISolone (PF) (SOLU-MEDROL) injection 60 mg  60 mg IntraVENous Q8H    influenza vaccine 2017-18 (3 yrs+)(PF) (FLUZONE QUAD/FLUARIX QUAD) injection 0.5 mL  0.5 mL IntraMUSCular PRIOR TO DISCHARGE    sodium chloride (NS) flush 5-10 mL  5-10 mL IntraVENous Q8H    sodium chloride (NS) flush 5-10 mL  5-10 mL IntraVENous PRN       Review of Systems    Constitutional: negative for fever, chills, sweats  Cardiovascular: negative for chest pain, palpitations, syncope, edema  Gastrointestinal:  negative for dysphagia, reflux, vomiting, diarrhea, abdominal pain, or melena  Neurologic:  negative for focal weakness, numbness, headache    Objective:     Vitals:    12/04/17 0715 12/04/17 0738 12/04/17 1103 12/04/17 1131   BP:  155/85  (!) 150/93   Pulse:  (!) 109  98   Resp:  17  20   Temp:  98.2 °F (36.8 °C)  97.8 °F (36.6 °C)   SpO2: 94% 95% (!) 78% 97%   Weight:       Height:         Intake and Output:   12/02 1901 - 12/04 0700  In: 300 [P.O.:300]  Out: -        Physical Exam:   Constitution:  the patient is well developed and in no acute distress  EENMT:  Sclera clear, pupils equal, oral mucosa moist  Respiratory: decreased breath sounds  Cardiovascular:  RRR without M,G,R  Gastrointestinal: soft and non-tender; with positive bowel sounds. Musculoskeletal: warm without cyanosis. There is no lower leg edema. Skin:  no jaundice or rashes, no wounds   Neurologic: no gross neuro deficits     Psychiatric:  alert and oriented x 3    CXR:       LAB  Recent Labs      12/04/17   0558  12/03/17   2112  12/03/17   1538  12/03/17   1144  12/03/17   0542   GLUCPOC  127*  157*  166*  146*  158*      Recent Labs      12/01/17   2113   WBC  13.4*   HGB  14.4   HCT  43.9   PLT  241     Recent Labs      12/01/17   2113   NA  138   K  3.7   CL  97*   CO2  33*   GLU  139*   BUN  13   CREA  0.55*   MG  2.1   CA  9.4     Recent Labs      12/01/17   2355   PH  7.38   PCO2  60*   PO2  35*   HCO3  34*     No results for input(s): LCAD, LAC in the last 72 hours.       Assessment:  (Medical Decision Making)     Hospital Problems  Date Reviewed: 12/2/2017          Codes Class Noted POA    * (Principal)Acute on chronic respiratory failure with hypoxia and hypercapnia (HCC) ICD-10-CM: J96.21, J96.22  ICD-9-CM: 518.84, 786.09, 799.02  12/2/2017 Yes    On 3 L    Hypoxia ICD-10-CM: R09.02  ICD-9-CM: 799.02  12/2/2017 Yes        Panlobular emphysema ([facility] ICD-10-CM: J43.1  ICD-9-CM: 492.8  12/2/2017 Yes        Chronic obstructive pulmonary disease with acute exacerbation ([facility] ICD-10-CM: J44.1  ICD-9-CM: 491.21  7/11/2017 Yes    Some better    Essential hypertension ICD-10-CM: I10  ICD-9-CM: 401.9  7/11/2017 Yes              Plan:  (Medical Decision Making)   1   Decreased steroids  2   Wean O2  --    More than 50% of the time documented was spent in face-to-face contact with the patient and in the care of the patient on the floor/unit where the patient is located.     Gus Gooden MD

## 2017-12-05 PROBLEM — R53.81 DEBILITY: Status: ACTIVE | Noted: 2017-01-01

## 2017-12-05 NOTE — PROGRESS NOTES
Pt resting in bed with eyes closed. No s/sx of distress noted at this time. Pt encouraged to call for assistance if needed call light in reach, door open will monitor.

## 2017-12-05 NOTE — PROGRESS NOTES
Pt resting in bed. Pt alert oriented times 3 at this time. Pt denies pain or distress at this time. Pt on 3 L NC At this time. Pt encouraged to call for assistance if needed call light in reach, door open will monitor.

## 2017-12-05 NOTE — PROGRESS NOTES
Physical assessment completed, pt alert and oriented, resting in bed, no visual s/s of pain or distress, breathing even and unlabored, safety measures in place, call light within reach.

## 2017-12-05 NOTE — PROGRESS NOTES
Ross Cole  Admission Date: 12/1/2017             Daily Progress Note: 12/5/2017    The patient's chart is reviewed and the patient is discussed with the staff. Subjective:       Cathy Duenas is a 76yoF who has severe COPD on nocturnal O2 who was admitted on 12/3 for a severe COPD exacerbation with worsened hypoxemia. She is on 2 L now- was previously only on O2 hs. She feels weak. - needs     Current Facility-Administered Medications   Medication Dose Route Frequency    docusate sodium (COLACE) capsule 100 mg  100 mg Oral BID    methylPREDNISolone (PF) (SOLU-MEDROL) injection 60 mg  60 mg IntraVENous Q12H    lisinopril (PRINIVIL, ZESTRIL) tablet 20 mg  20 mg Oral DAILY    montelukast (SINGULAIR) tablet 10 mg  10 mg Oral DAILY    albuterol-ipratropium (DUO-NEB) 2.5 MG-0.5 MG/3 ML  3 mL Nebulization Q4H RT    budesonide (PULMICORT) 500 mcg/2 ml nebulizer suspension  500 mcg Nebulization BID RT    enoxaparin (LOVENOX) injection 30 mg  30 mg SubCUTAneous Q24H    famotidine (PEPCID) tablet 20 mg  20 mg Oral DAILY    morphine injection 2 mg  2 mg IntraVENous Q4H PRN    magic mouthwash (NIKO) oral suspension 5 mL  5 mL Oral Q6H    insulin lispro (HUMALOG) injection   SubCUTAneous TIDAC    albuterol (PROVENTIL VENTOLIN) nebulizer solution 2.5 mg  2.5 mg Nebulization Q4H PRN    influenza vaccine 2017-18 (3 yrs+)(PF) (FLUZONE QUAD/FLUARIX QUAD) injection 0.5 mL  0.5 mL IntraMUSCular PRIOR TO DISCHARGE    sodium chloride (NS) flush 5-10 mL  5-10 mL IntraVENous Q8H    sodium chloride (NS) flush 5-10 mL  5-10 mL IntraVENous PRN       Review of Systems    Constitutional: negative for fever, chills, sweats  Cardiovascular: negative for chest pain, palpitations, syncope, edema  Gastrointestinal:  negative for dysphagia, reflux, vomiting, diarrhea, abdominal pain, or melena  Neurologic:  negative for focal weakness, numbness, headache    Objective:     Vitals:    12/05/17 0408 12/05/17 0732 12/05/17 0819 12/05/17 1100   BP:  160/78     Pulse:  82     Resp:  17     Temp:  98.3 °F (36.8 °C)     SpO2: 98% 99% 93% 95%   Weight:       Height:         Intake and Output:   12/03 1901 - 12/05 0700  In: 340 [P.O.:340]  Out: 3 [Urine:2]  12/05 0701 - 12/05 1900  In: 240 [P.O.:240]  Out: -     Physical Exam:   Constitution:  the patient is well developed and in no acute distress  EENMT:  Sclera clear, pupils equal, oral mucosa moist  Respiratory: clear  Cardiovascular:  RRR without M,G,R  Gastrointestinal: soft and non-tender; with positive bowel sounds. Musculoskeletal: warm without cyanosis. There is no lower leg edema. Skin:  no jaundice or rashes, no wounds   Neurologic: no gross neuro deficits     Psychiatric:  alert and oriented x 3    CXR:       LAB  Recent Labs      12/05/17   0520  12/04/17   2038  12/04/17   1541  12/04/17   1144  12/04/17   0558   GLUCPOC  116*  105*  131*  138*  127*      No results for input(s): WBC, HGB, HCT, PLT, INR, HGBEXT, HCTEXT, PLTEXT in the last 72 hours. No lab exists for component: INREXT  No results for input(s): NA, K, CL, CO2, GLU, BUN, CREA, MG, CA, PHOS, TROIQ, ALB, TBIL, TBILI, GPT, ALT, SGOT, BNPP in the last 72 hours. No lab exists for component: TROIP  No results for input(s): PH, PCO2, PO2, HCO3 in the last 72 hours. No results for input(s): LCAD, LAC in the last 72 hours.       Assessment:  (Medical Decision Making)     Hospital Problems  Date Reviewed: 12/2/2017          Codes Class Noted POA    * (Principal)Acute on chronic respiratory failure with hypoxia and hypercapnia (HCC) ICD-10-CM: J96.21, J96.22  ICD-9-CM: 518.84, 786.09, 799.02  12/2/2017 Yes    On nc    Hypoxia ICD-10-CM: R09.02  ICD-9-CM: 799.02  12/2/2017 Yes        Panlobular emphysema ([facility] ICD-10-CM: J43.1  ICD-9-CM: 492.8  12/2/2017 Yes        Chronic obstructive pulmonary disease with acute exacerbation ([facility] ICD-10-CM: J44.1  ICD-9-CM: 491.21  7/11/2017 Yes    Ongoing but no wheezing Essential hypertension ICD-10-CM: I10  ICD-9-CM: 401.9  7/11/2017 Yes    debility          Plan:  (Medical Decision Making)   1    Pt  2    Consult ss- for rehab  3    Decrease steroids  --    More than 50% of the time documented was spent in face-to-face contact with the patient and in the care of the patient on the floor/unit where the patient is located.     Susan Rajan MD

## 2017-12-05 NOTE — PROGRESS NOTES
Pt sitting up in chair. No distress noted at this time. Call light in reach, door open will monitor.

## 2017-12-05 NOTE — PROGRESS NOTES
Problem: Mobility Impaired (Adult and Pediatric)  Goal: *Acute Goals and Plan of Care (Insert Text)  1. Ms. Jeff Carey will perform transfers independently in 4 days. 2.  Ms. Jeff Carey will perform gait independently 150 ft on appropriate O2 with or without device in 4 days. PHYSICAL THERAPY: Daily Note, Treatment Day: 1st, PM 12/5/2017  INPATIENT: Hospital Day: 5  Payor: CARE IMPROVEMENT PLUS / [place] / Product Type: [employer] /      NAME/AGE/GENDER: Mukul Diaz is a 65 y.o. female   PRIMARY DIAGNOSIS: Hypoxia Acute on chronic respiratory failure with hypoxia and hypercapnia (HCC) Acute on chronic respiratory failure with hypoxia and hypercapnia (HCC)        ICD-10: Treatment Diagnosis:   · Generalized Muscle Weakness (M62.81)  · Difficulty in walking, Not elsewhere classified (R26.2)   Precaution/Allergies:  Sulfa (sulfonamide antibiotics)      ASSESSMENT:     Ms. Jeff Carey is supine in bed upon contact and agreeable to PT treatment this afternoon. Pt is independent with supine to sit EOB and SBA with sit to stand. Pt ambulated 100 ft in hallway with SBA while PT managed O2 tank. Pt returned to room with O2 sat at 77%. Pt educated on pursed lip breathing and slow returned to 91% with rest. Pt left sitting up in bedside chair with all needs met and within reach. Pt is making slow steady progress towards goals with SOB being most limiting factor in mobility. This section established at most recent assessment   PROBLEM LIST (Impairments causing functional limitations):  1. Decreased Transfer Abilities  2. Decreased Ambulation Ability/Technique  3. Decreased Activity Tolerance  4. Decreased Pacing Skills   INTERVENTIONS PLANNED: (Benefits and precautions of physical therapy have been discussed with the patient.)  1. Bed Mobility  2. Gait Training  3. Therapeutic Exercise/Strengthening  4. Transfer Training  5.  Group Therapy     TREATMENT PLAN: Frequency/Duration: 3 times a week for duration of hospital stay  Rehabilitation Potential For Stated Goals: Good     RECOMMENDED REHABILITATION/EQUIPMENT: (at time of discharge pending progress): Due to the probability of continued deficits (see above) this patient will likely need continued skilled physical therapy after discharge. Equipment:    None at this time              HISTORY:   History of Present Injury/Illness (Reason for Referral):  Patient is a 65 y.o.  female presents with sob. This patient is known to have severe COPD and she is oxygen dependent especially at nighttime. She is followed by her primary care physician never seen a pulmonologist previously. He came into the emergency room because of increasing shortness of breath which did not improve with her usual inhaler and nebulized medication at home. She stated her problems started at least 4 or 5 weeks ago with possible sinus infection. She was given several course of antibiotic and steroid without successful outcome. He does not recall if she had viral infection or any cold symptoms prior to her illness. She was given appropriate treatment with the emergency room department including IV corticosteroid, aerosolized bronchodilators, IV antibiotic. In spite of that she continue to have severe dyspnea and her ABGs clearly showed acute decompensation in her chronic respiratory failure with both hypercapnia and hypoxemia. At this point, she clearly failed outpatient therapy and will need inpatient hospitalization for intensive bronchodilator treatment, IV steroid and antibiotic. She will also need continuous oxygen supplementation and will benefit from outpatient pulmonary rehabilitation post discharge.     Past Medical History/Comorbidities:   Ms. Jarred Conklin  has a past medical history of Asthma; Bronchitis; Chronic obstructive pulmonary disease ([facility] and Hypertension. Ms. Jarred Conklin  has no past surgical history on file.   Social History/Living Environment:   Home Environment: Private residence  # Steps to Enter: 4  One/Two Story Residence: One story  Living Alone: No  Support Systems: Child(abel), Family member(s)  Patient Expects to be Discharged to[de-identified] Private residence  Current DME Used/Available at Home: Shower chair  Tub or Shower Type: Shower  Prior Level of Function/Work/Activity:  Independent short distances. age, smoker, COPD   Number of Personal Factors/Comorbidities that affect the Plan of Care: 3+: HIGH COMPLEXITY   EXAMINATION:   Most Recent Physical Functioning:   Gross Assessment:                  Posture:     Balance:  Standing - Static: Good  Standing - Dynamic : Fair Bed Mobility:  Supine to Sit: Independent  Wheelchair Mobility:     Transfers:  Sit to Stand: Supervision  Stand to Sit: Supervision  Gait:     Speed/Flro: Slow  Step Length: Left shortened;Right shortened  Gait Abnormalities: Decreased step clearance  Distance (ft): 100 Feet (ft)  Ambulation - Level of Assistance: Stand-by asssistance  Interventions: Safety awareness training;Verbal cues      Body Structures Involved:  1. Muscles Body Functions Affected:  1. Movement Related Activities and Participation Affected:  1. Mobility   Number of elements that affect the Plan of Care: 3: MODERATE COMPLEXITY   CLINICAL PRESENTATION:   Presentation: Evolving clinical presentation with changing clinical characteristics: MODERATE COMPLEXITY   CLINICAL DECISION MAKIN [facility] Inpatient Short Form  How much difficulty does the patient currently have. .. Unable A Lot A Little None   1. Turning over in bed (including adjusting bedclothes, sheets and blankets)? [] 1   [] 2   [] 3   [x] 4   2. Sitting down on and standing up from a chair with arms ( e.g., wheelchair, bedside commode, etc.)   [] 1   [] 2   [] 3   [x] 4   3. Moving from lying on back to sitting on the side of the bed?    [] 1   [] 2   [] 3   [x] 4   How much help from another person does the patient currently need... Total A Lot A Little None   4. Moving to and from a bed to a chair (including a wheelchair)? [] 1   [] 2   [] 3   [x] 4   5. Need to walk in hospital room? [] 1   [] 2   [x] 3   [] 4   6. Climbing 3-5 steps with a railing? [] 1   [] 2   [x] 3   [] 4   © 2007, Trustees of [address] [place] [address] [address] [address], under license to [employer]. All rights reserved      Score:  Initial: 22 Most Recent: X (Date: -- )    Interpretation of Tool:  Represents activities that are increasingly more difficult (i.e. Bed mobility, Transfers, Gait). Score 24 23 22-20 19-15 14-10 9-7 6     Modifier CH CI CJ CK CL CM CN      ? Mobility - Walking and Moving Around:     - CURRENT STATUS: CJ - 20%-39% impaired, limited or restricted    - GOAL STATUS: CI - 1%-19% impaired, limited or restricted    - D/C STATUS:  ---------------To be determined---------------  Payor: CARE IMPROVEMENT PLUS [place] / Product Type: Managed Care Medicare /      Medical Necessity:     · Patient is expected to demonstrate progress in functional technique to increase independence with mobility and gait. .  Reason for Services/Other Comments:  · Patient continues to require present interventions due to patient's inability to function at baseline. .   Use of outcome tool(s) and clinical judgement create a POC that gives a: Questionable prediction of patient's progress: MODERATE COMPLEXITY            TREATMENT:   (In addition to Assessment/Re-Assessment sessions the following treatments were rendered)   Pre-treatment Symptoms/Complaints:  none  Pain: Initial:   Pain Intensity 1: 0  Post Session:  SOB after ambulation     Therapeutic Activity: (    12 minutes): Therapeutic activities including Bed transfers, Chair transfers, Ambulation on level ground and breathing and pacing techniques to improve mobility, strength, balance and coordination.   Required minimal Safety awareness training;Verbal cues to promote dynamic balance in standing. Braces/Orthotics/Lines/Etc:   · O2 Device: Nasal cannula  Treatment/Session Assessment:    · Response to Treatment:  Fair. desats quickly with activity. · Interdisciplinary Collaboration:   o Physical Therapist  o Registered Nurse  · After treatment position/precautions:   o Up in chair  o Bed in low position  o Call light within reach   · Compliance with Program/Exercises: Will assess as treatment progresses. · Recommendations/Intent for next treatment session: \"Next visit will focus on advancements to more challenging activities and reduction in assistance provided\".   Total Treatment Duration:PT Patient Time In/Time Out  Time In: 4368  Time Out: 707 [address]

## 2017-12-06 NOTE — PROGRESS NOTES
Problem: Falls - Risk of  Goal: *Absence of Falls  Document Jorge Fall Risk and appropriate interventions in the flowsheet.    Outcome: Progressing Towards Goal  Fall Risk Interventions:            Medication Interventions: Bed/chair exit alarm    Elimination Interventions: Bed/chair exit alarm, Call light in reach    History of Falls Interventions: Bed/chair exit alarm

## 2017-12-06 NOTE — PROGRESS NOTES
Melissa Peace  Admission Date: 12/1/2017             Daily Progress Note: 12/6/2017    The patient's chart is reviewed and the patient is discussed with the staff. Subjective:     77yoF who has severe COPD on nocturnal O2 who was admitted on 12/3 for a severe COPD exacerbation with worsened hypoxemia.     This am more sob, O2 increased to 4 L    Current Facility-Administered Medications   Medication Dose Route Frequency    docusate sodium (COLACE) capsule 100 mg  100 mg Oral BID    methylPREDNISolone (PF) (SOLU-MEDROL) injection 60 mg  60 mg IntraVENous Q12H    lisinopril (PRINIVIL, ZESTRIL) tablet 20 mg  20 mg Oral DAILY    montelukast (SINGULAIR) tablet 10 mg  10 mg Oral DAILY    albuterol-ipratropium (DUO-NEB) 2.5 MG-0.5 MG/3 ML  3 mL Nebulization Q4H RT    budesonide (PULMICORT) 500 mcg/2 ml nebulizer suspension  500 mcg Nebulization BID RT    enoxaparin (LOVENOX) injection 30 mg  30 mg SubCUTAneous Q24H    famotidine (PEPCID) tablet 20 mg  20 mg Oral DAILY    morphine injection 2 mg  2 mg IntraVENous Q4H PRN    magic mouthwash (NIKO) oral suspension 5 mL  5 mL Oral Q6H    insulin lispro (HUMALOG) injection   SubCUTAneous TIDAC    albuterol (PROVENTIL VENTOLIN) nebulizer solution 2.5 mg  2.5 mg Nebulization Q4H PRN    influenza vaccine 2017-18 (3 yrs+)(PF) (FLUZONE QUAD/FLUARIX QUAD) injection 0.5 mL  0.5 mL IntraMUSCular PRIOR TO DISCHARGE    sodium chloride (NS) flush 5-10 mL  5-10 mL IntraVENous Q8H    sodium chloride (NS) flush 5-10 mL  5-10 mL IntraVENous PRN       Review of Systems    Constitutional: negative for fever, chills, sweats  Cardiovascular: negative for chest pain, palpitations, syncope, edema  Gastrointestinal:  negative for dysphagia, reflux, vomiting, diarrhea, abdominal pain, or melena  Neurologic:  negative for focal weakness, numbness, headache    Objective:     Vitals:    12/06/17 0333 12/06/17 0338 12/06/17 0720 12/06/17 0847   BP: 150/73  118/58 Pulse: 81  80    Resp: 16  18    Temp: 98 °F (36.7 °C)  98 °F (36.7 °C)    SpO2: 91% 92% 90% 93%   Weight:       Height:         Intake and Output:   12/04 1901 - 12/06 0700  In: 360 [P.O.:360]  Out: 254 [Urine:252]       Physical Exam:   Constitution:  the patient is well developed and in no acute distress  EENMT:  Sclera clear, pupils equal, oral mucosa moist  Respiratory:  Some wheezes  Cardiovascular:  RRR without M,G,R  Gastrointestinal: soft and non-tender; with positive bowel sounds. Musculoskeletal: warm without cyanosis. There is no lower leg edema. Skin:  no jaundice or rashes, no wounds   Neurologic: no gross neuro deficits     Psychiatric:  alert and oriented x 3    CXR:       LAB  Recent Labs      12/06/17   0514  12/05/17   1551  12/05/17   1126  12/05/17   0520  12/04/17   2038   GLUCPOC  116*  150*  94  116*  105*      No results for input(s): WBC, HGB, HCT, PLT, INR, HGBEXT, HCTEXT, PLTEXT in the last 72 hours. No lab exists for component: INREXT  No results for input(s): NA, K, CL, CO2, GLU, BUN, CREA, MG, CA, PHOS, TROIQ, ALB, TBIL, TBILI, GPT, ALT, SGOT, BNPP in the last 72 hours. No lab exists for component: TROIP  No results for input(s): PH, PCO2, PO2, HCO3 in the last 72 hours. No results for input(s): LCAD, LAC in the last 72 hours.       Assessment:  (Medical Decision Making)     Hospital Problems  Date Reviewed: 12/2/2017          Codes Class Noted POA    Debility ICD-10-CM: R53.81  ICD-9-CM: 799.3  12/5/2017 Unknown    Pt seeing    * (Principal)Acute on chronic respiratory failure with hypoxia and hypercapnia (HCC) ICD-10-CM: J96.21, J96.22  ICD-9-CM: 518.84, 786.09, 799.02  12/2/2017 Yes    On 4 L    Hypoxia ICD-10-CM: R09.02  ICD-9-CM: 799.02  12/2/2017 Yes        Panlobular emphysema ([facility] ICD-10-CM: J43.1  ICD-9-CM: 492.8  12/2/2017 Yes        Chronic obstructive pulmonary disease with acute exacerbation ([facility] ICD-10-CM: J44.1  ICD-9-CM: 491.21  7/11/2017 Yes    Some wheezing    Essential hypertension ICD-10-CM: I10  ICD-9-CM: 401.9  7/11/2017 Yes              Plan:  (Medical Decision Making)   1    Repeat lab  2    Continue iv steroids and bronchodilators  --    More than 50% of the time documented was spent in face-to-face contact with the patient and in the care of the patient on the floor/unit where the patient is located.     Jaz Forte MD

## 2017-12-06 NOTE — PROGRESS NOTES
Pt in room alert oriented rr are labored at rest. Pt has on 2l NC no distress noted. Pt states she is good. Call light in reach will continue to monitor.

## 2017-12-06 NOTE — PROGRESS NOTES
Pt up in room alert and oriented. rr are labored at rest. Pt is on O2 4l NC. Pt has no c/o pain. Pt has wheezing to rosalia lungs no distress noted at current time. Pt abd is soft bowel sounds are active. Pt eats well ambulates with assistance. Call light in reach. Will continue to monitor.

## 2017-12-07 PROBLEM — I10 ESSENTIAL HYPERTENSION: Chronic | Status: ACTIVE | Noted: 2017-01-01

## 2017-12-07 PROBLEM — R53.81 DEBILITY: Chronic | Status: ACTIVE | Noted: 2017-01-01

## 2017-12-07 PROBLEM — J44.1 CHRONIC OBSTRUCTIVE PULMONARY DISEASE WITH ACUTE EXACERBATION (HCC): Status: ACTIVE | Noted: 2017-01-01

## 2017-12-07 NOTE — PROGRESS NOTES
Pt sitting up in bed. Pt alert oriented times 3 at this time. Pt on 2  L NC At this time. Pt denies pain or distress at this time. Pt encouraged to call for assistance if needed call light in reach, door open will monitor.

## 2017-12-07 NOTE — PROGRESS NOTES
Pt resting in bed. Pt on 2 L NC at this time. No distress noted at this time. Call light in reach, door open will monitor.

## 2017-12-07 NOTE — PROGRESS NOTES
Problem: Mobility Impaired (Adult and Pediatric)  Goal: *Acute Goals and Plan of Care (Insert Text)  1. Ms. Kesha Peterson will perform transfers independently in 4 days. goal met 12/7/17  2. Ms. Kesha Peterson will perform gait independently 150 ft on appropriate O2 with or without device in 4 days. PHYSICAL THERAPY: Daily Note, Treatment Day: 2nd, AM 12/7/2017  INPATIENT: Hospital Day: 7  Payor: CARE IMPROVEMENT PLUS / [place] / Product Type: [employer] /      NAME/AGE/GENDER: Isabella Garcia is a 65 y.o. female   PRIMARY DIAGNOSIS: Hypoxia Acute on chronic respiratory failure with hypoxia and hypercapnia (HCC) Acute on chronic respiratory failure with hypoxia and hypercapnia (HCC)        ICD-10: Treatment Diagnosis:   · Generalized Muscle Weakness (M62.81)  · Difficulty in walking, Not elsewhere classified (R26.2)   Precaution/Allergies:  Sulfa (sulfonamide antibiotics)      ASSESSMENT:     Ms. Kesha Peterson is supine in bed upon contact and agreeable to PT treatment this morning. Pt is currently on 2L O2 NC with sats at 95%. Pt is independent with supine to sit EOB and supervision with sit to stand. Pt ambulated 35-40 ft with SBA with O2 sat at 87%. Pt took rest break and educated again on pursed lip breathing. PT increased O2 sat to 3L. Pt ambulated another 40 ft back to room and returned to sitting up in bedside chair. Pt O2 sat now at 80% following gait. Pt increased to 4L O2 NC and again requiring VC for pursed lip breathing with O2 sat returning to 93-94%. Following rest break pt weaned back down to 92% with O2 sat remaining at 93%. Pt left sitting up in chair with all needs met and within reach. Pt continues to be very limited with her activity secondary to SOB. Transfer goal met this session. Will continue efforts. This section established at most recent assessment   PROBLEM LIST (Impairments causing functional limitations):  1. Decreased Transfer Abilities  2.  Decreased Ambulation Ability/Technique  3. Decreased Activity Tolerance  4. Decreased Pacing Skills   INTERVENTIONS PLANNED: (Benefits and precautions of physical therapy have been discussed with the patient.)  1. Bed Mobility  2. Gait Training  3. Therapeutic Exercise/Strengthening  4. Transfer Training  5. Group Therapy     TREATMENT PLAN: Frequency/Duration: 3 times a week for duration of hospital stay  Rehabilitation Potential For Stated Goals: Good     RECOMMENDED REHABILITATION/EQUIPMENT: (at time of discharge pending progress): Due to the probability of continued deficits (see above) this patient will likely need continued skilled physical therapy after discharge. Equipment:    None at this time              HISTORY:   History of Present Injury/Illness (Reason for Referral):  Patient is a 65 y.o.  female presents with sob. This patient is known to have severe COPD and she is oxygen dependent especially at nighttime. She is followed by her primary care physician never seen a pulmonologist previously. He came into the emergency room because of increasing shortness of breath which did not improve with her usual inhaler and nebulized medication at home. She stated her problems started at least 4 or 5 weeks ago with possible sinus infection. She was given several course of antibiotic and steroid without successful outcome. He does not recall if she had viral infection or any cold symptoms prior to her illness. She was given appropriate treatment with the emergency room department including IV corticosteroid, aerosolized bronchodilators, IV antibiotic. In spite of that she continue to have severe dyspnea and her ABGs clearly showed acute decompensation in her chronic respiratory failure with both hypercapnia and hypoxemia. At this point, she clearly failed outpatient therapy and will need inpatient hospitalization for intensive bronchodilator treatment, IV steroid and antibiotic.   She will also need continuous oxygen supplementation and will benefit from outpatient pulmonary rehabilitation post discharge.     Past Medical History/Comorbidities:   Ms. Alicia Humphreys  has a past medical history of Asthma; Bronchitis; Chronic obstructive pulmonary disease ([address] and Hypertension. Ms. Alicia Humphreys  has no past surgical history on file. Social History/Living Environment:   Home Environment: Private residence  # Steps to Enter: 4  One/Two Story Residence: One story  Living Alone: No  Support Systems: Child(abel), Family member(s)  Patient Expects to be Discharged to[de-identified] Private residence  Current DME Used/Available at Home: Shower chair  Tub or Shower Type: Shower  Prior Level of Function/Work/Activity:  Independent short distances. age, smoker, COPD   Number of Personal Factors/Comorbidities that affect the Plan of Care: 3+: HIGH COMPLEXITY   EXAMINATION:   Most Recent Physical Functioning:   Gross Assessment:                  Posture:     Balance:  Standing - Static: Good  Standing - Dynamic : Fair Bed Mobility:  Supine to Sit: Independent  Wheelchair Mobility:     Transfers:  Sit to Stand: Supervision  Stand to Sit: Supervision  Gait:     Speed/Flor: Slow  Step Length: Left shortened;Right shortened  Gait Abnormalities: Decreased step clearance  Distance (ft): 80 Feet (ft)  Ambulation - Level of Assistance: Stand-by asssistance      Body Structures Involved:  1. Muscles Body Functions Affected:  1. Movement Related Activities and Participation Affected:  1. Mobility   Number of elements that affect the Plan of Care: 3: MODERATE COMPLEXITY   CLINICAL PRESENTATION:   Presentation: Evolving clinical presentation with changing clinical characteristics: MODERATE COMPLEXITY   CLINICAL DECISION MAKIN [facility] Mobility Inpatient Short Form  How much difficulty does the patient currently have. .. Unable A Lot A Little None   1. Turning over in bed (including adjusting bedclothes, sheets and blankets)?    [] 1 [] 2   [] 3   [x] 4   2. Sitting down on and standing up from a chair with arms ( e.g., wheelchair, bedside commode, etc.)   [] 1   [] 2   [] 3   [x] 4   3. Moving from lying on back to sitting on the side of the bed? [] 1   [] 2   [] 3   [x] 4   How much help from another person does the patient currently need. .. Total A Lot A Little None   4. Moving to and from a bed to a chair (including a wheelchair)? [] 1   [] 2   [] 3   [x] 4   5. Need to walk in hospital room? [] 1   [] 2   [x] 3   [] 4   6. Climbing 3-5 steps with a railing? [] 1   [] 2   [x] 3   [] 4   © 2007, Trustees of [address] [place] [address], under license to [employer]. All rights reserved      Score:  Initial: 22 Most Recent: X (Date: -- )    Interpretation of Tool:  Represents activities that are increasingly more difficult (i.e. Bed mobility, Transfers, Gait). Score 24 23 22-20 19-15 14-10 9-7 6     Modifier CH CI CJ CK CL CM CN      ? Mobility - Walking and Moving Around:     - CURRENT STATUS: CJ - 20%-39% impaired, limited or restricted    - GOAL STATUS: CI - 1%-19% impaired, limited or restricted    - D/C STATUS:  ---------------To be determined---------------  Payor: CARE IMPROVEMENT PLUS [place] / Product Type: Man[employer] Medicare /      Medical Necessity:     · Patient is expected to demonstrate progress in functional technique to increase independence with mobility and gait. .  Reason for Services/Other Comments:  · Patient continues to require present interventions due to patient's inability to function at baseline. .   Use of outcome tool(s) and clinical judgement create a POC that gives a: Questionable prediction of patient's progress: MODERATE COMPLEXITY            TREATMENT:   (In addition to Assessment/Re-Assessment sessions the following treatments were rendered)   Pre-treatment Symptoms/Complaints:  none  Pain: Initial:   Pain Intensity 1: 0  Post Session:  SOB after ambulation Therapeutic Activity: (    16 minutes): Therapeutic activities including Bed transfers, Chair transfers, Ambulation on level ground and breathing and pacing techniques to improve mobility, strength, balance and coordination. Required minimal   to promote dynamic balance in standing. Braces/Orthotics/Lines/Etc:   · O2 Device: Nasal cannula  Treatment/Session Assessment:    · Response to Treatment:  Fair. desats quickly with activity. · Interdisciplinary Collaboration:   o Physical Therapist  o Registered Nurse  · After treatment position/precautions:   o Up in chair  o Bed/Chair-wheels locked  o Bed in low position  o Call light within reach  o RN notified   · Compliance with Program/Exercises: Will assess as treatment progresses. · Recommendations/Intent for next treatment session: \"Next visit will focus on advancements to more challenging activities and reduction in assistance provided\".   Total Treatment Duration:PT Patient Time In/Time Out  Time In: 1101  Time Out: [id number]

## 2017-12-07 NOTE — PROGRESS NOTES
Pt resting in bed reading a book. No distress noted at this time. Pt encouraged to call for assistance if needed call light in reach, door open will monitor.

## 2017-12-07 NOTE — PROGRESS NOTES
Problem: Nutrition Deficit  Goal: *Optimize nutritional status  Nutrition LOS Note: Day 5  Assessment  Diet order(s): Regular  Food,Nutrition, and Pertinent History: Patient presents with no acute nutrition risk factors identified by malnutrition screening tool upon admission. The patient is noted to have a h/o COPD and asthma. The patient reports that her appetite is doing well. States the food here has been wonderful. The patient denies any po difficulties at this time. The pt has no questions or concerns regarding nutrition at this time. Provided patient with a menu and discussed other available options with patient. Anthropometrics: Height: 5' 5\" (165.1 cm), Weight: 49.9 kg (110 lb), Weight Source: unknown, Body mass index is 18.3 kg/(m^2). BMI class of underweight. Macronutrient Needs:  · EER:  7416-3200 kcal /day (30-35 kcal/kg listed BW)  · EPR:  50-60 grams protein/day (1-1.2 grams/kg listed BW)  Intake/Comparative Standards:  Average intake for past 5 day(s)/10 recorded meal(s): 79%. This potentially meets ~100% of kcal and ~100% of protein needs    Nutrition Diagnosis: No nutrition diagnosis at this time    Intervention:   Meals and snacks: Continue current diet. Discharge nutrition plan: No discharge needs identified    Deena Garrett.  Letitia [address], [address],    [phone]

## 2017-12-07 NOTE — PROGRESS NOTES
Kristen Caldwell  Admission Date: 12/1/2017             Daily Progress Note: 12/7/2017    The patient's chart is reviewed and the patient is discussed with the staff. 65 y.o. CF presents with sob that did not improve with home nebs and inhalers. Has  Had symptoms for 4-5 weeks with possible sinus infection. She is known to have severe COPD and O2 dependent at nighttime. Is followed by PCP and has never seen a pulmonologist.   Sinus infection was treated with several courses of antibiotic and steroid improvement. In ER received IV corticosteroid, aerosolized bronchodilators, IV antibiotic. She continued with dyspnea and ABGs showed acute decompensation in her chronic respiratory failure with both hypercapnia and hypoxemia. She was admitted for further treatment. Chest CT with no PE but severe COPD, small right effusion and CAD. Had thick sputum production. Improved and steroids weaned. Subjective:     Lying in bed and still with significant shortness of breath with activity. Participating with PT but feels she needs additional rehab.       Current Facility-Administered Medications   Medication Dose Route Frequency    docusate sodium (COLACE) capsule 100 mg  100 mg Oral BID    methylPREDNISolone (PF) (SOLU-MEDROL) injection 60 mg  60 mg IntraVENous Q12H    lisinopril (PRINIVIL, ZESTRIL) tablet 20 mg  20 mg Oral DAILY    montelukast (SINGULAIR) tablet 10 mg  10 mg Oral DAILY    albuterol-ipratropium (DUO-NEB) 2.5 MG-0.5 MG/3 ML  3 mL Nebulization Q4H RT    budesonide (PULMICORT) 500 mcg/2 ml nebulizer suspension  500 mcg Nebulization BID RT    enoxaparin (LOVENOX) injection 30 mg  30 mg SubCUTAneous Q24H    famotidine (PEPCID) tablet 20 mg  20 mg Oral DAILY    morphine injection 2 mg  2 mg IntraVENous Q4H PRN    magic mouthwash (NIKO) oral suspension 5 mL  5 mL Oral Q6H    insulin lispro (HUMALOG) injection   SubCUTAneous TIDAC    albuterol (PROVENTIL VENTOLIN) nebulizer solution 2.5 mg  2.5 mg Nebulization Q4H PRN    influenza vaccine 2017-18 (3 yrs+)(PF) (FLUZONE QUAD/FLUARIX QUAD) injection 0.5 mL  0.5 mL IntraMUSCular PRIOR TO DISCHARGE    sodium chloride (NS) flush 5-10 mL  5-10 mL IntraVENous Q8H    sodium chloride (NS) flush 5-10 mL  5-10 mL IntraVENous PRN       Review of Systems  Constitutional: negative for fever, chills, sweats  Cardiovascular: negative for chest pain, palpitations, syncope, edema  Gastrointestinal:  negative for dysphagia, reflux, vomiting, diarrhea, abdominal pain, or melena  Neurologic:  negative for focal weakness, numbness, headache    Objective:     Vitals:    12/07/17 0708 12/07/17 0845 12/07/17 1041 12/07/17 1131   BP: 146/69  145/74    Pulse: 85  85    Resp: 18  18    Temp: 98.4 °F (36.9 °C)  98.2 °F (36.8 °C)    SpO2: 95% 94% 94% 95%   Weight:       Height:         Intake and Output:   12/05 1901 - 12/07 0700  In: 1200 [P.O.:1200]  Out: 251 [Urine:250]  12/07 0701 - 12/07 1900  In: 240 [P.O.:240]  Out: -     Physical Exam:   Constitution:  the patient is thin elderly and in no acute distress, NC 2L  EENMT:  Sclera clear, pupils equal, oral mucosa moist  Respiratory: scattered crackles and few wheezes  Cardiovascular:  RRR without M,G,R  Gastrointestinal: soft and non-tender; with positive bowel sounds. Musculoskeletal: warm without cyanosis. There is no lower leg edema. Skin:  no jaundice or rashes, no wounds   Neurologic: no gross neuro deficits     Psychiatric:  alert and oriented x 3    CXR: None today      LAB  Recent Labs      12/07/17   1040  12/07/17   0607  12/06/17   1600  12/06/17   1143  12/06/17   0514   GLUCPOC  87  109*  125*  90  116*      Recent Labs      12/07/17   0607   WBC  8.7   HGB  13.1   HCT  41.8   PLT  231     Recent Labs      12/07/17   0607   NA  136   K  4.4   CL  97*   CO2  34*   GLU  113*   BUN  16   CREA  0.58*   CA  8.2*     No results for input(s): PH, PCO2, PO2, HCO3 in the last 72 hours.   No results for input(s): LCAD, LAC in the last 72 hours. Assessment:  (Medical Decision Making)     Hospital Problems  Date Reviewed: 12/7/2017          Codes Class Noted POA    Debility (Chronic) ICD-10-CM: R53.81  ICD-9-CM: 799.3  12/5/2017 Yes    Chronic--PT for mobility--possible rehab    Chronic obstructive pulmonary disease with acute exacerbation ([facility] ICD-10-CM: J44.1  ICD-9-CM: 491.21  12/2/2017 Yes    chronic    Essential hypertension (Chronic) ICD-10-CM: I10  ICD-9-CM: 401.9  12/2/2017 Yes    Chronic--SBP 140s    * (Principal)Acute on chronic respiratory failure with hypoxia and hypercapnia (HCC) ICD-10-CM: J96.21, J96.22  ICD-9-CM: 518.84, 786.09, 799.02  12/2/2017 Yes    Uses home O2 at night only    Hypoxia ICD-10-CM: R09.02  ICD-9-CM: 799.02  12/2/2017 Yes    Sat acceptable on NC    Panlobular emphysema ([facility] ICD-10-CM: J43.1  ICD-9-CM: 492.8  12/2/2017 Yes    chronic          Plan:  (Medical Decision Making)     --Duoneb, Pulmicort, Singulair  --Solu Medrol 60mg q12h  --PT following  --Consult case management for possible rehab. More than 50% of the time documented was spent in face-to-face contact with the patient and in the care of the patient on the floor/unit where the patient is located. Saida Ngo NP     Lungs:  Decreased BS with coarse rhonchi bilaterally  Heart:  RRR with no Murmur/Rubs/Gallops    Additional Comments:  Feeling better and sputum getting lighter. Add mucinex to thin secretions given chest exam. Also decrease duoneb. I have spoken with and examined the patient. I agree with the above assessment and plan as documented.     Waqas Love MD

## 2017-12-08 NOTE — PROGRESS NOTES
Pt in room alert and oriented. rr are even and unlabored. Lung sounds are diminished with some scattered wheezing. Pt is on O2 2L tolerates well. Pt has no c/o pain no distress noted. Pt abd is soft bowel sounds are active. Pt has stated she does feel better. Pt is able to ambulate with assistance. Pt is stable call light in reach. Will continue to monitor.

## 2017-12-08 NOTE — PROGRESS NOTES
Problem: Falls - Risk of  Goal: *Absence of Falls  Document Jorge Fall Risk and appropriate interventions in the flowsheet.    Outcome: Progressing Towards Goal  Fall Risk Interventions:            Medication Interventions: Evaluate medications/consider consulting pharmacy    Elimination Interventions: Bed/chair exit alarm, Call light in reach    History of Falls Interventions: Bed/chair exit alarm

## 2017-12-08 NOTE — PROGRESS NOTES
Terrel Snellen  Admission Date: 12/1/2017             Daily Progress Note: 12/8/2017    The patient's chart is reviewed and the patient is discussed with the staff. 65 y.o. CF presents with sob that did not improve with home nebs and inhalers. Has  Had symptoms for 4-5 weeks with possible sinus infection. She is known to have severe COPD and O2 dependent at nighttime. Is followed by PCP and has never seen a pulmonologist.   Sinus infection was treated with several courses of antibiotic and steroid improvement. In ER received IV corticosteroid, aerosolized bronchodilators, IV antibiotic. She continued with dyspnea and ABGs showed acute decompensation in her chronic respiratory failure with both hypercapnia and hypoxemia. She was admitted for further treatment. Chest CT with no PE but severe COPD, small right effusion and CAD. Had thick sputum production. Improved and steroids weaned. Subjective:     Sitting up in chair and states is feeling some better today with less coughing. Still with shortness of breath with activity. Planning to go to rehab at discharge.       Current Facility-Administered Medications   Medication Dose Route Frequency    albuterol-ipratropium (DUO-NEB) 2.5 MG-0.5 MG/3 ML  3 mL Nebulization QID RT    guaiFENesin ER (MUCINEX) tablet 1,200 mg  1,200 mg Oral BID    methylPREDNISolone (PF) (SOLU-MEDROL) injection 40 mg  40 mg IntraVENous BID    docusate sodium (COLACE) capsule 100 mg  100 mg Oral BID    lisinopril (PRINIVIL, ZESTRIL) tablet 20 mg  20 mg Oral DAILY    montelukast (SINGULAIR) tablet 10 mg  10 mg Oral DAILY    budesonide (PULMICORT) 500 mcg/2 ml nebulizer suspension  500 mcg Nebulization BID RT    enoxaparin (LOVENOX) injection 30 mg  30 mg SubCUTAneous Q24H    famotidine (PEPCID) tablet 20 mg  20 mg Oral DAILY    morphine injection 2 mg  2 mg IntraVENous Q4H PRN    magic mouthwash (NIKO) oral suspension 5 mL  5 mL Oral Q6H    insulin lispro (HUMALOG) injection   SubCUTAneous TIDAC    albuterol (PROVENTIL VENTOLIN) nebulizer solution 2.5 mg  2.5 mg Nebulization Q4H PRN    influenza vaccine 2017-18 (3 yrs+)(PF) (FLUZONE QUAD/FLUARIX QUAD) injection 0.5 mL  0.5 mL IntraMUSCular PRIOR TO DISCHARGE    sodium chloride (NS) flush 5-10 mL  5-10 mL IntraVENous Q8H    sodium chloride (NS) flush 5-10 mL  5-10 mL IntraVENous PRN       Review of Systems  Constitutional: negative for fever, chills, sweats  Cardiovascular: negative for chest pain, palpitations, syncope, edema  Gastrointestinal:  negative for dysphagia, reflux, vomiting, diarrhea, abdominal pain, or melena  Neurologic:  negative for focal weakness, numbness, headache    Objective:     Vitals:    12/07/17 2324 12/08/17 0322 12/08/17 0717 12/08/17 0732   BP: 142/72 137/73  159/71   Pulse: 88 83  73   Resp: 18 18  18   Temp: 97.9 °F (36.6 °C) 98.1 °F (36.7 °C)  98 °F (36.7 °C)   SpO2: 94% 97% 98% 99%   Weight:       Height:         Intake and Output:   12/06 1901 - 12/08 0700  In: 1800 [P.O.:1800]  Out: -        Physical Exam:   Constitution:  the patient is thin, elderly and in no acute distress, NC 2L, sat 99%  EENMT:  Sclera clear, pupils equal, oral mucosa moist  Respiratory: few anterior and posteriorscattered crackles and few wheezes  Cardiovascular:  RRR without M,G,R  Gastrointestinal: soft and non-tender; with positive bowel sounds. Musculoskeletal: warm without cyanosis. There is no lower leg edema.   Skin:  no jaundice or rashes, no wounds   Neurologic: no gross neuro deficits     Psychiatric:  alert and oriented x 3    CXR: None today      LAB  Recent Labs      12/08/17   0509  12/07/17   1547  12/07/17   1040  12/07/17   0607  12/06/17   1600   GLUCPOC  86  120*  87  109*  125*      Recent Labs      12/07/17   0607   WBC  8.7   HGB  13.1   HCT  41.8   PLT  231     Recent Labs      12/07/17   0607   NA  136   K  4.4   CL  97*   CO2  34*   GLU  113*   BUN  16   CREA  0.58*   CA 8.2*     No results for input(s): PH, PCO2, PO2, HCO3 in the last 72 hours. No results for input(s): LCAD, LAC in the last 72 hours. Assessment:  (Medical Decision Making)     Hospital Problems  Date Reviewed: 12/8/2017          Codes Class Noted POA    Debility (Chronic) ICD-10-CM: R53.81  ICD-9-CM: 799.3  12/5/2017 Yes    Chronic--PT for mobility--possible rehab    Chronic obstructive pulmonary disease with acute exacerbation ([facility] ICD-10-CM: J44.1  ICD-9-CM: 491.21  12/2/2017 Yes    chronic    Essential hypertension (Chronic) ICD-10-CM: I10  ICD-9-CM: 401.9  12/2/2017 Yes    Chronic---150s    * (Principal)Acute on chronic respiratory failure with hypoxia and hypercapnia (HCC) ICD-10-CM: J96.21, J96.22  ICD-9-CM: 518.84, 786.09, 799.02  12/2/2017 Yes    Usually wears O2 at 2L night only    Hypoxia ICD-10-CM: R09.02  ICD-9-CM: 799.02  12/2/2017 Yes    Sat acceptable on NC    Panlobular emphysema ([facility] ICD-10-CM: J43.1  ICD-9-CM: 492.8  12/2/2017 Yes    chronic          Plan:  (Medical Decision Making)     --Duoneb, Pulmicort, Singulair, Mucinex  --Solu Medrol 40mg BID  --PT following  --Case management assisting with possible rehab--sebastian Santiago    More than 50% of the time documented was spent in face-to-face contact with the patient and in the care of the patient on the floor/unit where the patient is located. Colby Inocencia, NP     Lungs: Few rhonchi   Heart:  RRR with no Murmur/Rubs/Gallops    Additional Comments:  Improved. Try to wean steroids a little. I have spoken with and examined the patient. I agree with the above assessment and plan as documented.     Andrés Dalton MD

## 2017-12-08 NOTE — PROGRESS NOTES
Pt is up in room alert and oriented. rr are even and unlabored lung sounds are diminished pt is on 3l NC she tolerates well. Pt is stable no c/o pain no distress noted. No increased bleeding or bruising noted. Call light in reach will continue to monitor.

## 2017-12-08 NOTE — PROGRESS NOTES
Met with patient regarding discharge planning. Patient has recently moved in with her son in [facility], but states that she has been sick for several weeks now and would like to go to short-term rehab before return home. Patient selected [facility] as her preferred facility. Bed request to [facility]. Case Management will continue to follow.     Care Management Interventions  Transition of Care Consult (CM Consult): Discharge Planning  Discharge Durable Medical Equipment: No  Physical Therapy Consult: Yes  Occupational Therapy Consult: Yes  Speech Therapy Consult: No  Current Support Network: Relative's Home  Confirm Follow Up Transport: Family  Plan discussed with Pt/Family/Caregiver: Yes  Freedom of Choice Offered: Yes  Discharge Location  Discharge Placement: Rehab Unit Subacute

## 2017-12-08 NOTE — PROGRESS NOTES
Problem: Mobility Impaired (Adult and Pediatric)  Goal: *Acute Goals and Plan of Care (Insert Text)  1. Ms. Vasiliy Ball will perform transfers independently in 4 days. goal met 12/7/17  2. Ms. Vasiliy Ball will perform gait independently 150 ft on appropriate O2 with or without device in 4 days. PHYSICAL THERAPY: Daily Note, Treatment Day: 3rd, PM 12/8/2017  INPATIENT: Hospital Day: 8  Payor: CARE IMPROVEMENT PLUS / [place] / Product Type: Managed Care Medicare /      NAME/AGE/GENDER: Linda Campo is a 65 y.o. female   PRIMARY DIAGNOSIS: Hypoxia Acute on chronic respiratory failure with hypoxia and hypercapnia (HCC) Acute on chronic respiratory failure with hypoxia and hypercapnia (HCC)        ICD-10: Treatment Diagnosis:   · Generalized Muscle Weakness (M62.81)  · Difficulty in walking, Not elsewhere classified (R26.2)   Precaution/Allergies:  Sulfa (sulfonamide antibiotics)      ASSESSMENT:     Ms. Vasiliy Ball is supine in bed upon contact and agreeable to PT treatment this afternoon. Pt is currently on 2L O2 NC with O2 sat at 91%. Pt performed supine to sit EOB independently and sit to stand with supervision. Pt ambulated 30 ft in hallway with O2 sat at 95% on 2 L O2 NC. Pt ambulated another 40 ft with O2 sat decreasing o 87%. Pt educated again on pursed lip breathing technique. Despite pursed lip breathing and rest pt did not recover O2 sat. Increased to 3L and then to 4L O2. Pt ambulated another 40 ft back to room with O2 sat at 95%. Pt returned to sitting and weaned back down to 2L O2 NC with O2 sat at 98% following activity. Pt returned to supine independently with all needs met and within reach. Pt continues to make slow steady progress towards goals. Will continue efforts. This section established at most recent assessment   PROBLEM LIST (Impairments causing functional limitations):  1. Decreased Transfer Abilities  2. Decreased Ambulation Ability/Technique  3.  Decreased Activity Tolerance  4. Decreased Pacing Skills   INTERVENTIONS PLANNED: (Benefits and precautions of physical therapy have been discussed with the patient.)  1. Bed Mobility  2. Gait Training  3. Therapeutic Exercise/Strengthening  4. Transfer Training  5. Group Therapy     TREATMENT PLAN: Frequency/Duration: 3 times a week for duration of hospital stay  Rehabilitation Potential For Stated Goals: Good     RECOMMENDED REHABILITATION/EQUIPMENT: (at time of discharge pending progress): Due to the probability of continued deficits (see above) this patient will likely need continued skilled physical therapy after discharge. Equipment:    None at this time              HISTORY:   History of Present Injury/Illness (Reason for Referral):  Patient is a 65 y.o.  female presents with sob. This patient is known to have severe COPD and she is oxygen dependent especially at nighttime. She is followed by her primary care physician never seen a pulmonologist previously. He came into the emergency room because of increasing shortness of breath which did not improve with her usual inhaler and nebulized medication at home. She stated her problems started at least 4 or 5 weeks ago with possible sinus infection. She was given several course of antibiotic and steroid without successful outcome. He does not recall if she had viral infection or any cold symptoms prior to her illness. She was given appropriate treatment with the emergency room department including IV corticosteroid, aerosolized bronchodilators, IV antibiotic. In spite of that she continue to have severe dyspnea and her ABGs clearly showed acute decompensation in her chronic respiratory failure with both hypercapnia and hypoxemia. At this point, she clearly failed outpatient therapy and will need inpatient hospitalization for intensive bronchodilator treatment, IV steroid and antibiotic.   She will also need continuous oxygen supplementation and will benefit from outpatient pulmonary rehabilitation post discharge.     Past Medical History/Comorbidities:   Ms. Arielle Garcia  has a past medical history of Asthma; Bronchitis; Chronic obstructive pulmonary disease ([address] and Hypertension. Ms. Arielle Garcia  has no past surgical history on file. Social History/Living Environment:   Home Environment: Private residence  # Steps to Enter: 4  One/Two Story Residence: One story  Living Alone: No  Support Systems: Child(abel), Family member(s)  Patient Expects to be Discharged to[de-identified] Private residence  Current DME Used/Available at Home: Shower chair  Tub or Shower Type: Shower  Prior Level of Function/Work/Activity:  Independent short distances. age, smoker, COPD   Number of Personal Factors/Comorbidities that affect the Plan of Care: 3+: HIGH COMPLEXITY   EXAMINATION:   Most Recent Physical Functioning:   Gross Assessment:                  Posture:     Balance:  Standing - Static: Good  Standing - Dynamic : Fair Bed Mobility:  Supine to Sit: Independent  Sit to Supine: Independent  Wheelchair Mobility:     Transfers:  Sit to Stand: Supervision  Stand to Sit: Supervision  Gait:     Speed/Flor: Slow  Step Length: Left shortened;Right shortened  Gait Abnormalities: Decreased step clearance  Distance (ft): 110 Feet (ft)  Ambulation - Level of Assistance: Stand-by asssistance  Interventions: Safety awareness training;Verbal cues      Body Structures Involved:  1. Muscles Body Functions Affected:  1. Movement Related Activities and Participation Affected:  1. Mobility   Number of elements that affect the Plan of Care: 3: MODERATE COMPLEXITY   CLINICAL PRESENTATION:   Presentation: Evolving clinical presentation with changing clinical characteristics: MODERATE COMPLEXITY   CLINICAL DECISION MAKIN [facility] Inpatient Short Form  How much difficulty does the patient currently have. .. Unable A Lot A Little None   1.   Turning over in bed (including adjusting bedclothes, sheets and blankets)? [] 1   [] 2   [] 3   [x] 4   2. Sitting down on and standing up from a chair with arms ( e.g., wheelchair, bedside commode, etc.)   [] 1   [] 2   [] 3   [x] 4   3. Moving from lying on back to sitting on the side of the bed? [] 1   [] 2   [] 3   [x] 4   How much help from another person does the patient currently need. .. Total A Lot A Little None   4. Moving to and from a bed to a chair (including a wheelchair)? [] 1   [] 2   [] 3   [x] 4   5. Need to walk in hospital room? [] 1   [] 2   [x] 3   [] 4   6. Climbing 3-5 steps with a railing? [] 1   [] 2   [x] 3   [] 4   © 2007, Trustees of [address] [place] [address], under license to [employer]. All rights reserved      Score:  Initial: 22 Most Recent: X (Date: -- )    Interpretation of Tool:  Represents activities that are increasingly more difficult (i.e. Bed mobility, Transfers, Gait). Score 24 23 22-20 19-15 14-10 9-7 6     Modifier CH CI CJ CK CL CM CN      ? Mobility - Walking and Moving Around:     - CURRENT STATUS: CJ - 20%-39% impaired, limited or restricted    - GOAL STATUS: CI - 1%-19% impaired, limited or restricted    - D/C STATUS:  ---------------To be determined---------------  Payor: CARE IMPROVEMENT PLUS [place] / Product Type: Managed Care Medicare /      Medical Necessity:     · Patient is expected to demonstrate progress in functional technique to increase independence with mobility and gait. .  Reason for Services/Other Comments:  · Patient continues to require present interventions due to patient's inability to function at baseline. .   Use of outcome tool(s) and clinical judgement create a POC that gives a: Questionable prediction of patient's progress: MODERATE COMPLEXITY            TREATMENT:   (In addition to Assessment/Re-Assessment sessions the following treatments were rendered)   Pre-treatment Symptoms/Complaints:  none  Pain: Initial:   Pain Intensity 1: 0 Post Session:  SOB after ambulation     Therapeutic Activity: (    10 minutes): Therapeutic activities including Bed transfers, Chair transfers, Ambulation on level ground and breathing and pacing techniques to improve mobility, strength, balance and coordination. Required minimal Safety awareness training;Verbal cues to promote dynamic balance in standing. Braces/Orthotics/Lines/Etc:   · O2 Device: Nasal cannula  Treatment/Session Assessment:    · Response to Treatment:  Fair. desats quickly with activity. · Interdisciplinary Collaboration:   o Physical Therapist  o Registered Nurse  · After treatment position/precautions:   o Supine in bed  o Bed/Chair-wheels locked  o Bed in low position  o Call light within reach   · Compliance with Program/Exercises: Will assess as treatment progresses. · Recommendations/Intent for next treatment session: \"Next visit will focus on advancements to more challenging activities and reduction in assistance provided\".   Total Treatment Duration:PT Patient Time In/Time Out  Time In: 9600  Time Out: 2101 [facility]

## 2017-12-09 NOTE — PROGRESS NOTES
Melissa Peace  Admission Date: 12/1/2017             Daily Progress Note: 12/9/2017    The patient's chart is reviewed and the patient is discussed with the staff. 65 y.o. CF presents with sob that did not improve with home nebs and inhalers. Has  Had symptoms for 4-5 weeks with possible sinus infection. She is known to have severe COPD and O2 dependent at nighttime. Is followed by PCP and has never seen a pulmonologist.   Sinus infection was treated with several courses of antibiotic and steroid improvement. In ER received IV corticosteroid, aerosolized bronchodilators, IV antibiotic. She continued with dyspnea and ABGs showed acute decompensation in her chronic respiratory failure with both hypercapnia and hypoxemia. She was admitted for further treatment. Chest CT with no PE but severe COPD, small right effusion and CAD. Had thick sputum production. Improved and steroids weaned. Subjective:     Up in chair and feels better. Still a few wheezes. BS dropped some this AM. Awaiting rehab bed.      Current Facility-Administered Medications   Medication Dose Route Frequency    methylPREDNISolone (PF) (SOLU-MEDROL) injection 30 mg  30 mg IntraVENous BID    albuterol (PROVENTIL VENTOLIN) nebulizer solution 2.5 mg  2.5 mg Nebulization QID RT    guaiFENesin ER (MUCINEX) tablet 1,200 mg  1,200 mg Oral BID    docusate sodium (COLACE) capsule 100 mg  100 mg Oral BID    lisinopril (PRINIVIL, ZESTRIL) tablet 20 mg  20 mg Oral DAILY    montelukast (SINGULAIR) tablet 10 mg  10 mg Oral DAILY    budesonide (PULMICORT) 500 mcg/2 ml nebulizer suspension  500 mcg Nebulization BID RT    enoxaparin (LOVENOX) injection 30 mg  30 mg SubCUTAneous Q24H    famotidine (PEPCID) tablet 20 mg  20 mg Oral DAILY    morphine injection 2 mg  2 mg IntraVENous Q4H PRN    magic mouthwash (NIKO) oral suspension 5 mL  5 mL Oral Q6H    insulin lispro (HUMALOG) injection   SubCUTAneous TIDAC    albuterol (PROVENTIL VENTOLIN) nebulizer solution 2.5 mg  2.5 mg Nebulization Q4H PRN    influenza vaccine 2017-18 (3 yrs+)(PF) (FLUZONE QUAD/FLUARIX QUAD) injection 0.5 mL  0.5 mL IntraMUSCular PRIOR TO DISCHARGE    sodium chloride (NS) flush 5-10 mL  5-10 mL IntraVENous Q8H    sodium chloride (NS) flush 5-10 mL  5-10 mL IntraVENous PRN       Review of Systems  Constitutional: negative for fever, chills, sweats  Cardiovascular: negative for chest pain, palpitations, syncope, edema  Gastrointestinal:  negative for dysphagia, reflux, vomiting, diarrhea, abdominal pain, or melena  Neurologic:  negative for focal weakness, numbness, headache    Objective:     Vitals:    12/08/17 2315 12/09/17 0405 12/09/17 0707 12/09/17 0742   BP: 144/62 143/76  130/71   Pulse: 86 74  79   Resp: 18 18  18   Temp: 98.3 °F (36.8 °C) 98.5 °F (36.9 °C)  98.1 °F (36.7 °C)   SpO2: 95% 98% 98% 97%   Weight:       Height:         Intake and Output:   12/07 1901 - 12/09 0700  In: 4807 [P.O.:1780]  Out: -   12/09 0701 - 12/09 1900  In: 240 [P.O.:240]  Out: -     Physical Exam:   Constitution:  the patient is thin, elderly and in no acute distress, NC 2L, sat 97%  EENMT:  Sclera clear, pupils equal, oral mucosa moist  Respiratory: few anterior wheezes  Cardiovascular:  RRR without M,G,R  Gastrointestinal: soft and non-tender; with positive bowel sounds. Musculoskeletal: warm without cyanosis. There is no lower leg edema.   Skin:  no jaundice or rashes, no wounds   Neurologic: no gross neuro deficits     Psychiatric:  alert and oriented x 3    CXR: None today      LAB  Recent Labs      12/09/17   0553  12/08/17   1551  12/08/17   1127  12/08/17   0509  12/07/17   1547   GLUCPOC  85  131*  87  86  120*      Recent Labs      12/07/17   0607   WBC  8.7   HGB  13.1   HCT  41.8   PLT  231     Recent Labs      12/07/17   0607   NA  136   K  4.4   CL  97*   CO2  34*   GLU  113*   BUN  16   CREA  0.58*   CA  8.2*     No results for input(s): PH, PCO2, PO2, HCO3 in the last 72 hours. No results for input(s): LCAD, LAC in the last 72 hours. Assessment:  (Medical Decision Making)     Hospital Problems  Date Reviewed: 12/8/2017          Codes Class Noted POA    Debility (Chronic) ICD-10-CM: R53.81  ICD-9-CM: 799.3  12/5/2017 Yes    Chronic--PT for mobility-- rehab    Chronic obstructive pulmonary disease with acute exacerbation ([facility] ICD-10-CM: J44.1  ICD-9-CM: 491.21  12/2/2017 Yes    chronic    Essential hypertension (Chronic) ICD-10-CM: I10  ICD-9-CM: 401.9  12/2/2017 Yes    Chronic---150s    * (Principal)Acute on chronic respiratory failure with hypoxia and hypercapnia (HCC) ICD-10-CM: J96.21, J96.22  ICD-9-CM: 518.84, 786.09, 799.02  12/2/2017 Yes    Usually wears O2 at 2L night only    Hypoxia ICD-10-CM: R09.02  ICD-9-CM: 799.02  12/2/2017 Yes    Sat acceptable on NC    Panlobular emphysema ([facility] ICD-10-CM: J43.1  ICD-9-CM: 492.8  12/2/2017 Yes    chronic          Plan:  (Medical Decision Making)     Albuterol. Decrease steroids a little. Mobilize. Rehab likely on Monday.     Gume Beard MD

## 2017-12-09 NOTE — PROGRESS NOTES
Patient is calm and receptive to  presence. Feeling better. Patient demonstrates a positive attitude towards her care -  Looking forward to rehab. Assured her of our prayers.     Chai Phillips, staff [address], [address]  /   [email or web address]

## 2017-12-09 NOTE — PROGRESS NOTES
Pt resting in bed, alert and oriented, denies pain or distress, breathing even and unlabored, safety measures in place, call light within reach.

## 2017-12-09 NOTE — PROGRESS NOTES
Pt resting in bed with eyes closed. Pt awakens when spoken to. Pt oriented times 3 at this time. Pt denies pain or distress at this time. Pt on 2 L NC At this time. Pt encouraged to call for assistance if needed call light in reach, door open will monitor.

## 2017-12-10 NOTE — PROGRESS NOTES
August Black  Admission Date: 12/1/2017             Daily Progress Note: 12/10/2017    The patient's chart is reviewed and the patient is discussed with the staff. 65 y.o. CF presents with sob that did not improve with home nebs and inhalers. Has  Had symptoms for 4-5 weeks with possible sinus infection. She is known to have severe COPD and O2 dependent at nighttime. Is followed by PCP and has never seen a pulmonologist.   Sinus infection was treated with several courses of antibiotic and steroid improvement. In ER received IV corticosteroid, aerosolized bronchodilators, IV antibiotic. She continued with dyspnea and ABGs showed acute decompensation in her chronic respiratory failure with both hypercapnia and hypoxemia. She was admitted for further treatment. Chest CT with no PE but severe COPD, small right effusion and CAD. Had thick sputum production. Improved and steroids weaned. Subjective:     Feels about the same. Awaiting rehab bed.      Current Facility-Administered Medications   Medication Dose Route Frequency    methylPREDNISolone (PF) (SOLU-MEDROL) injection 20 mg  20 mg IntraVENous BID    albuterol (PROVENTIL VENTOLIN) nebulizer solution 2.5 mg  2.5 mg Nebulization QID RT    guaiFENesin ER (MUCINEX) tablet 1,200 mg  1,200 mg Oral BID    docusate sodium (COLACE) capsule 100 mg  100 mg Oral BID    lisinopril (PRINIVIL, ZESTRIL) tablet 20 mg  20 mg Oral DAILY    montelukast (SINGULAIR) tablet 10 mg  10 mg Oral DAILY    budesonide (PULMICORT) 500 mcg/2 ml nebulizer suspension  500 mcg Nebulization BID RT    enoxaparin (LOVENOX) injection 30 mg  30 mg SubCUTAneous Q24H    famotidine (PEPCID) tablet 20 mg  20 mg Oral DAILY    morphine injection 2 mg  2 mg IntraVENous Q4H PRN    magic mouthwash (NIKO) oral suspension 5 mL  5 mL Oral Q6H    insulin lispro (HUMALOG) injection   SubCUTAneous TIDAC    albuterol (PROVENTIL VENTOLIN) nebulizer solution 2.5 mg  2.5 mg Nebulization Q4H PRN    influenza vaccine 2017-18 (3 yrs+)(PF) (FLUZONE QUAD/FLUARIX QUAD) injection 0.5 mL  0.5 mL IntraMUSCular PRIOR TO DISCHARGE    sodium chloride (NS) flush 5-10 mL  5-10 mL IntraVENous Q8H    sodium chloride (NS) flush 5-10 mL  5-10 mL IntraVENous PRN       Review of Systems  Constitutional: negative for fever, chills, sweats  Cardiovascular: negative for chest pain, palpitations, syncope, edema  Gastrointestinal:  negative for dysphagia, reflux, vomiting, diarrhea, abdominal pain, or melena  Neurologic:  negative for focal weakness, numbness, headache    Objective:     Vitals:    12/10/17 0313 12/10/17 0608 12/10/17 0711 12/10/17 0827   BP: 159/71  140/72    Pulse: 77  79    Resp: 19  24    Temp: 97.9 °F (36.6 °C)  97.7 °F (36.5 °C)    SpO2: 98%  96% 98%   Weight:  116 lb 14.4 oz (53 kg)     Height:         Intake and Output:   12/08 1901 - 12/10 0700  In: 36 [P.O.:820]  Out: -        Physical Exam:   Constitution:  the patient is thin, elderly and in no acute distress, NC 2L, sat 98%  EENMT:  Sclera clear, pupils equal, oral mucosa moist  Respiratory: few scattered rhonchi  Cardiovascular:  RRR without M,G,R  Gastrointestinal: soft and non-tender; with positive bowel sounds. Musculoskeletal: warm without cyanosis. There is no lower leg edema. Skin:  no jaundice or rashes, no wounds   Neurologic: no gross neuro deficits     Psychiatric:  alert and oriented x 3    CXR: None today      LAB  Recent Labs      12/10/17   0532  12/09/17   1608  12/09/17   1150  12/09/17   0553  12/08/17   1551   GLUCPOC  78  137*  110*  85  131*      No results for input(s): WBC, HGB, HCT, PLT, INR, HGBEXT, HCTEXT, PLTEXT, HGBEXT, HCTEXT, PLTEXT in the last 72 hours. No lab exists for component: INREXT, INREXT  No results for input(s): NA, K, CL, CO2, GLU, BUN, CREA, MG, CA, PHOS, TROIQ, ALB, TBIL, TBILI, GPT, ALT, SGOT, BNPP in the last 72 hours.     No lab exists for component: TROIP  No results for input(s): PH, PCO2, PO2, HCO3 in the last 72 hours. No results for input(s): LCAD, LAC in the last 72 hours. Assessment:  (Medical Decision Making)     Hospital Problems  Date Reviewed: 12/8/2017          Codes Class Noted POA    Debility (Chronic) ICD-10-CM: R53.81  ICD-9-CM: 799.3  12/5/2017 Yes    Chronic--PT for mobility-- rehab    Chronic obstructive pulmonary disease with acute exacerbation ([facility] ICD-10-CM: J44.1  ICD-9-CM: 491.21  12/2/2017 Yes    Better. Essential hypertension (Chronic) ICD-10-CM: I10  ICD-9-CM: 401.9  12/2/2017 Yes    Chronic---150s    * (Principal)Acute on chronic respiratory failure with hypoxia and hypercapnia (HCC) ICD-10-CM: J96.21, J96.22  ICD-9-CM: 518.84, 786.09, 799.02  12/2/2017 Yes    Usually wears O2 at 2L night only    Hypoxia ICD-10-CM: R09.02  ICD-9-CM: 799.02  12/2/2017 Yes    Sat acceptable on NC    Panlobular emphysema ([facility] ICD-10-CM: J43.1  ICD-9-CM: 492.8  12/2/2017 Yes    chronic          Plan:  (Medical Decision Making)     Albuterol. PO steroids  Mobilize. Rehab likely on Monday.     Manuel Gonzales MD

## 2017-12-10 NOTE — PROGRESS NOTES
Patient resting in bed on left side. O2 at 2.5L NC, resp even and unlabored. No c/o discomfort or shortness of breath thus far. Call light within reach, door open. No distress noted.

## 2017-12-10 NOTE — PROGRESS NOTES
Patient sitting up in bed watching TV. O2 At 2.5L NC. Resp even and unlabored. Denies shortness of breath at rest. Voices that she does become short of breath with exertion. Denies discomfort. Patient is [place]. Encouraged to call for assist. No distress noted.

## 2017-12-10 NOTE — PROGRESS NOTES
Assessment completed. Pt sitting up in bed. Denies any pain. RR even and unlabored. On 2 L via NC. AAO x 4. Aware to call for assistance when needed. Bed locked, in low position, call light in reach. Will monitor.

## 2017-12-10 NOTE — PROGRESS NOTES
Uneventful shift. OOB in chair most of the day. Denies any pain. Aware to call for assistance when needed. Bed locked, in low position,call light in reach. Will monitor.

## 2017-12-11 NOTE — PROGRESS NOTES
Problem: Mobility Impaired (Adult and Pediatric)  Goal: *Acute Goals and Plan of Care (Insert Text)  1. Ms. Lizbeth Gibson will perform transfers independently in 4 days. goal met 12/7/17  2. Ms. Lzibeth Gibson will perform gait independently 150 ft on appropriate O2 with or without device in 4 days. PHYSICAL THERAPY: Daily Note, Treatment Day: 3rd, PM 12/11/2017  INPATIENT: Hospital Day: 11  Payor: CARE IMPROVEMENT PLUS / [place] / Product Type: [employer] Medicare /      NAME/AGE/GENDER: Melissa Peace is a 65 y.o. female   PRIMARY DIAGNOSIS: Hypoxia Acute on chronic respiratory failure with hypoxia and hypercapnia (HCC) Acute on chronic respiratory failure with hypoxia and hypercapnia (HCC)        ICD-10: Treatment Diagnosis:   · Generalized Muscle Weakness (M62.81)  · Difficulty in walking, Not elsewhere classified (R26.2)   Precaution/Allergies:  Sulfa (sulfonamide antibiotics)      ASSESSMENT:     Ms. Lzibeth Gibson is sitting in chair upon contact and agreeable to PT treatment this afternoon. Pt is currently on 2L O2 NC with O2 sat at 94%. Pt performed supine to sit EOB independently and sit to stand with supervision. Pt ambulated 1200 ft in hallway with O2 sat at 92% on 2 L O2 NC. Pt returned to sitting and weaned back down to 2L O2 NC with O2 sat at 98% following activity. Pt returned to supine independently with all needs met and within reach. Pt continues to make slow steady progress towards goals. Will continue efforts. This section established at most recent assessment   PROBLEM LIST (Impairments causing functional limitations):  1. Decreased Transfer Abilities  2. Decreased Ambulation Ability/Technique  3. Decreased Activity Tolerance  4. Decreased Pacing Skills   INTERVENTIONS PLANNED: (Benefits and precautions of physical therapy have been discussed with the patient.)  1. Bed Mobility  2. Gait Training  3. Therapeutic Exercise/Strengthening  4. Transfer Training  5.  Group Therapy     TREATMENT PLAN: Frequency/Duration: 3 times a week for duration of hospital stay  Rehabilitation Potential For Stated Goals: Good     RECOMMENDED REHABILITATION/EQUIPMENT: (at time of discharge pending progress): Due to the probability of continued deficits (see above) this patient will likely need continued skilled physical therapy after discharge. Equipment:    None at this time              HISTORY:   History of Present Injury/Illness (Reason for Referral):  Patient is a 65 y.o.  female presents with sob. This patient is known to have severe COPD and she is oxygen dependent especially at nighttime. She is followed by her primary care physician never seen a pulmonologist previously. He came into the emergency room because of increasing shortness of breath which did not improve with her usual inhaler and nebulized medication at home. She stated her problems started at least 4 or 5 weeks ago with possible sinus infection. She was given several course of antibiotic and steroid without successful outcome. He does not recall if she had viral infection or any cold symptoms prior to her illness. She was given appropriate treatment with the emergency room department including IV corticosteroid, aerosolized bronchodilators, IV antibiotic. In spite of that she continue to have severe dyspnea and her ABGs clearly showed acute decompensation in her chronic respiratory failure with both hypercapnia and hypoxemia. At this point, she clearly failed outpatient therapy and will need inpatient hospitalization for intensive bronchodilator treatment, IV steroid and antibiotic. She will also need continuous oxygen supplementation and will benefit from outpatient pulmonary rehabilitation post discharge.     Past Medical History/Comorbidities:   Ms. Mary Allen  has a past medical history of Asthma; Bronchitis; Chronic obstructive pulmonary disease ([address] and Hypertension. Ms. Mary Allen  has no past surgical history on file.   Social History/Living Environment:   Home Environment: Private residence  # Steps to Enter: 4  One/Two Story Residence: One story  Living Alone: No  Support Systems: Child(abel), Family member(s)  Patient Expects to be Discharged to[de-identified] Private residence  Current DME Used/Available at Home: Shower chair  Tub or Shower Type: Shower  Prior Level of Function/Work/Activity:  Independent short distances. age, smoker, COPD   Number of Personal Factors/Comorbidities that affect the Plan of Care: 3+: HIGH COMPLEXITY   EXAMINATION:   Most Recent Physical Functioning:   Gross Assessment:                  Posture:     Balance:  Sitting: Intact  Standing: Intact Bed Mobility:     Wheelchair Mobility:     Transfers:  Sit to Stand: Supervision  Stand to Sit: Supervision  Gait:     Gait Abnormalities: Shuffling gait; Path deviations  Distance (ft): 120 Feet (ft)  Ambulation - Level of Assistance: Supervision      Body Structures Involved:  1. Muscles Body Functions Affected:  1. Movement Related Activities and Participation Affected:  1. Mobility   Number of elements that affect the Plan of Care: 3: MODERATE COMPLEXITY   CLINICAL PRESENTATION:   Presentation: Evolving clinical presentation with changing clinical characteristics: MODERATE COMPLEXITY   CLINICAL DECISION MAKIN [facility] Mobility Inpatient Short Form  How much difficulty does the patient currently have. .. Unable A Lot A Little None   1. Turning over in bed (including adjusting bedclothes, sheets and blankets)? [] 1   [] 2   [] 3   [x] 4   2. Sitting down on and standing up from a chair with arms ( e.g., wheelchair, bedside commode, etc.)   [] 1   [] 2   [] 3   [x] 4   3. Moving from lying on back to sitting on the side of the bed? [] 1   [] 2   [] 3   [x] 4   How much help from another person does the patient currently need. .. Total A Lot A Little None   4. Moving to and from a bed to a chair (including a wheelchair)?    [] 1   [] 2 [] 3   [x] 4   5. Need to walk in hospital room? [] 1   [] 2   [x] 3   [] 4   6. Climbing 3-5 steps with a railing? [] 1   [] 2   [x] 3   [] 4   © 2007, Trustees of [address] [place] [address] [address] [address], under license to [employer]. All rights reserved      Score:  Initial: 22 Most Recent: X (Date: -- )    Interpretation of Tool:  Represents activities that are increasingly more difficult (i.e. Bed mobility, Transfers, Gait). Score 24 23 22-20 19-15 14-10 9-7 6     Modifier CH CI CJ CK CL CM CN      ? Mobility - Walking and Moving Around:     - CURRENT STATUS: CJ - 20%-39% impaired, limited or restricted    - GOAL STATUS: CI - 1%-19% impaired, limited or restricted    - D/C STATUS:  ---------------To be determined---------------  Payor: CARE IMPROVEMENT PLUS [place] / Product Type: [employer] Care Medicare /      Medical Necessity:     · Patient is expected to demonstrate progress in functional technique to increase independence with mobility and gait. .  Reason for Services/Other Comments:  · Patient continues to require present interventions due to patient's inability to function at baseline. .   Use of outcome tool(s) and clinical judgement create a POC that gives a: Questionable prediction of patient's progress: MODERATE COMPLEXITY            TREATMENT:   (In addition to Assessment/Re-Assessment sessions the following treatments were rendered)   Pre-treatment Symptoms/Complaints:  none  Pain: Initial:   Pain Intensity 1: 0  Post Session:  SOB after ambulation     Therapeutic Activity: (    24 minutes): Therapeutic activities including Bed transfers, Chair transfers, Ambulation on level ground and breathing and pacing techniques to improve mobility, strength, balance and coordination. Required minimal   to promote dynamic balance in standing. Braces/Orthotics/Lines/Etc:   · 2L  · O2 Device: Nasal cannula  Treatment/Session Assessment:    · Response to Treatment:  Fair.  desats quickly with activity. · Interdisciplinary Collaboration:   o Physical Therapist  o Registered Nurse  · After treatment position/precautions:   o Up in chair  o Call light within reach  o RN notified   · Compliance with Program/Exercises: Will assess as treatment progresses. · Recommendations/Intent for next treatment session: \"Next visit will focus on advancements to more challenging activities and reduction in assistance provided\".   Total Treatment Duration:PT Patient Time In/Time Out  Time In: 1140  Time Out: [address], DPT

## 2017-12-11 NOTE — PROGRESS NOTES
Assessment completed. Pt sitting up on side of bed eating breakfast. No s/s of distress. On 2. 5 L via NC. Aware to call for assistance when needed. Bed locked, in low position,call light in reach. Will monitor.

## 2017-12-11 NOTE — PROGRESS NOTES
Patient is discharging to [facility] today. She will transport via [employer] at 14:15 (next available transport time). Patient voices understanding and agreement with the discharge plan.     Care Management Interventions  Transition of Care Consult (CM Consult): Discharge Planning  Discharge Durable Medical Equipment: No  Physical Therapy Consult: Yes  Occupational Therapy Consult: Yes  Speech Therapy Consult: No  Current Support Network: Relative's Home  Confirm Follow Up Transport: Family  Plan discussed with Pt/Family/Caregiver: Yes  Freedom of Choice Offered: Yes  Discharge Location  Discharge Placement: Rehab Unit Subacute

## 2017-12-11 NOTE — PROGRESS NOTES
Discharge Note    Sally Avila  Admission date:  12/1/2017  Discharge date:  12/11/2017     Admitting Diagnosis:  Hypoxia    Discharge Diagnoses:   Hospital Problems  Date Reviewed: 12/11/2017          Codes Class Noted POA    Debility (Chronic) ICD-10-CM: R53.81  ICD-9-CM: 799.3  12/5/2017 Yes        Chronic obstructive pulmonary disease with acute exacerbation ([facility] ICD-10-CM: J44.1  ICD-9-CM: 491.21  12/2/2017 Yes        Essential hypertension (Chronic) ICD-10-CM: I10  ICD-9-CM: 401.9  12/2/2017 Yes        * (Principal)Acute on chronic respiratory failure with hypoxia and hypercapnia (HCC) ICD-10-CM: J96.21, J96.22  ICD-9-CM: 518.84, 786.09, 799.02  12/2/2017 Yes        Hypoxia ICD-10-CM: R09.02  ICD-9-CM: 799.02  12/2/2017 Yes        Panlobular emphysema ([facility] ICD-10-CM: J43.1  ICD-9-CM: 492.8  12/2/2017 Yes              Consultants:  PT/OT    Studies/Procedures:  CXR 12/4/17:  INTERVAL IMPROVEMENT IN PULMONARY EDEMA WITH PERSISTENT SMALL  BILATERAL PLEURAL EFFUSIONS    Chest CT 12/1/17:    No evidence of pulmonary embolism. Severe COPD with bibasilar atelectasis. Small loculated right pleural effusion. Coronary artery disease. Condition on Discharge:  stable    Disposition:  Rehab at [facility] course:  65 y.o. CF presents with sob that did not improve with home nebs and inhalers. Has  Had symptoms for 4-5 weeks with possible sinus infection. She is known to have severe COPD and O2 dependent at nighttime.  Is followed by PCP and has never seen a pulmonologist.   Sinus infection was treated with several courses of antibiotic and steroid improvement.  In ER received IV corticosteroid, aerosolized bronchodilators, IV antibiotic. She continued with dyspnea and ABGs showed acute decompensation in her chronic respiratory failure with both hypercapnia and hypoxemia.   She was admitted for further treatment. Chest CT with no PE but severe COPD, small right effusion and CAD.   Had thick sputum production. Improved and steroids weaned. She is now ready to go to rehab at [facility].  Will follow up in our office in 4 weeks with spirometry and continue Prednisone taper. Physical Exam:   Constitution:  the patient is well developed and in no acute distress, NC 2L  EENMT:  Sclera clear, pupils equal, oral mucosa moist  Respiratory: few scattered crackles, no wheezing  Cardiovascular:  RRR without M,G,R  Gastrointestinal: soft and non-tender; with positive bowel sounds. Musculoskeletal: warm without cyanosis. There is no lower leg edema. Skin:  no jaundice or rashes, no wounds   Neurologic: no gross neuro deficits     Psychiatric:  alert and oriented x 3      LAB  No results for input(s): WBC, HGB, HCT, PLT, INR, HGBEXT, HCTEXT, PLTEXT in the last 72 hours. No lab exists for component: INREXT  No results for input(s): NA, K, CL, CO2, BUN, CREA, MG, PHOS, ALB, TBIL, GPT, BNPP in the last 72 hours. No lab exists for component: TROIP,  SGOT  No results for input(s): PH, PCO2, PO2, HCO3 in the last 72 hours. Discharge Medications:   Current Discharge Medication List      START taking these medications    Details   guaiFENesin ER (MUCINEX) 1,200 mg Ta12 ER tablet Take 1 Tab by mouth two (2) times a day. Qty: 1 Tab, Refills: 0      albuterol (PROVENTIL VENTOLIN) 2.5 mg /3 mL (0.083 %) nebulizer solution 3 mL by Nebulization route four (4) times daily. Qty: 24 Each, Refills: 0      docusate sodium (COLACE) 100 mg capsule Take 1 Cap by mouth two (2) times a day for 90 days. Indications: constipation  Qty: 1 Cap, Refills: 0      famotidine (PEPCID) 20 mg tablet Take 1 Tab by mouth daily. Qty: 1 Tab, Refills: 0      magic mouthwash (NIKO) susp Take 5 mL by mouth every six (6) hours. Qty: 1 Bottle, Refills: 0      predniSONE (DELTASONE) 20 mg tablet Take 1 tablet daily for 3 days then, take 1/2 tablet daily for 3 days then stop.   Qty: 6 Tab, Refills: 0         CONTINUE these medications which have NOT CHANGED    Details   ADVAIR DISKUS 250-50 mcg/dose diskus inhaler Take 1 Puff by inhalation two (2) times a day. Qty: 3 Inhaler, Refills: 3      lisinopril (PRINIVIL, ZESTRIL) 20 mg tablet TAKE 1 TABLET EVERY DAY  Qty: 30 Tab, Refills: 11    Associated Diagnoses: Essential hypertension, benign      albuterol (PROAIR HFA) 90 mcg/actuation inhaler INHALE 1 PUFF EVERY 4 HOURS AS NEEDED FOR WHEEZING OR SHORTNESS OF BREATH  Qty: 8.5 Inhaler, Refills: 11      montelukast (SINGULAIR) 10 mg tablet Take 1 Tab by mouth daily. Qty: 90 Tab, Refills: 3      acetaminophen (TYLENOL) 325 mg tablet Take  by mouth every four (4) hours as needed for Pain.      tiotropium bromide (SPIRIVA RESPIMAT) 2.5 mcg/actuation inhaler Take 2 Puffs by inhalation daily. Qty: 3 Inhaler, Refills: 3      cetirizine (ZYRTEC) 10 mg tablet Take  by mouth.      multivitamin, tx-iron-ca-min (THERA-M W/ IRON) 9 mg iron-400 mcg tab tablet Take 1 Tab by mouth daily. cholecalciferol, VITAMIN D3, (VITAMIN D3) 5,000 unit tab tablet Take  by mouth daily. Nebulizer & Compressor machine dispinse 1 nebulizer compressor and tubing and inhalation device. Dx: 493.9, 491.2, 492.8  Qty: 1 Each, Refills: 0    Associated Diagnoses: COPD (chronic obstructive pulmonary disease) with chronic bronchitis (HCC); COPD (chronic obstructive pulmonary disease) with emphysema ([facility] Asthma, severe persistent, uncomplicated         STOP taking these medications       albuterol-ipratropium (DUO-NEB) 2.5 mg-0.5 mg/3 ml nebu Comments:   Reason for Stopping:         levoFLOXacin (LEVAQUIN) 500 mg tablet Comments:   Reason for Stopping:         predniSONE (STERAPRED DS) 10 mg dose pack Comments:   Reason for Stopping: Followup/Outpt Studies:  --Follow up appointment with [facility] in 4 weeks with spirometry.   --Continue supplemental O2--is O2 dependent  --Continue Prednisone taper  --Total discharge greater than 30 minutes in duration. More than 50% of the time documented was spent in face-to-face contact with the patient and in the care of the patient on the floor/unit where the patient is located. Kj Salinas NP        Lungs:  CTA B, no w/r/r  Heart:  RRR with no Murmur/Rubs/Gallops    Additional Comments:    Patient ready for discharge to rehab after being treated for COPD exacerbation with plan for office follow up. I have spoken with and examined the patient. I agree with the above assessment and plan as documented.     Frida Garcias MD

## 2017-12-11 NOTE — PROGRESS NOTES
Report given to FABIAN Cooper from [facility]. Provided nurse with contact info for any further questions after dc.

## 2017-12-11 NOTE — PROGRESS NOTES
Patient has bed available at [address] patient to be medically stable for discharge. Case Management will continue to follow.     Care Management Interventions  Transition of Care Consult (CM Consult): Discharge Planning  Discharge Durable Medical Equipment: No  Physical Therapy Consult: Yes  Occupational Therapy Consult: Yes  Speech Therapy Consult: No  Current Support Network: Relative's Home  Confirm Follow Up Transport: Family  Plan discussed with Pt/Family/Caregiver: Yes  Freedom of Choice Offered: Yes  Discharge Location  Discharge Placement: Rehab Unit Subacute

## 2017-12-11 NOTE — PROGRESS NOTES
Patient has been stable this shift with no c/o discomfort or shortness of breath. Report to be given to oncoming nurse.

## 2017-12-11 NOTE — PROGRESS NOTES
BSR from Hailey Jones RN Patient sitting in bedside recliner, alert and oriented times four O2 2 and 1/2L, Resp even and unlabored. Patient denies pain , denies shortness of breath. Call light in place. Safety measures in place. No distress noted.  Patient encouraged to call for assist.

## 2018-01-01 ENCOUNTER — HOME CARE VISIT (OUTPATIENT)
Dept: SCHEDULING | Facility: HOME HEALTH | Age: 78
End: 2018-01-01
Payer: MEDICARE

## 2018-01-01 ENCOUNTER — HOME CARE VISIT (OUTPATIENT)
Dept: HOSPICE | Facility: HOSPICE | Age: 78
End: 2018-01-01
Payer: MEDICARE

## 2018-01-01 ENCOUNTER — APPOINTMENT (OUTPATIENT)
Dept: GENERAL RADIOLOGY | Age: 78
End: 2018-01-01
Attending: STUDENT IN AN ORGANIZED HEALTH CARE EDUCATION/TRAINING PROGRAM
Payer: MEDICARE

## 2018-01-01 ENCOUNTER — HOME CARE VISIT (OUTPATIENT)
Dept: HOME HEALTH SERVICES | Facility: HOME HEALTH | Age: 78
End: 2018-01-01
Payer: MEDICARE

## 2018-01-01 ENCOUNTER — HOSPICE ADMISSION (OUTPATIENT)
Dept: HOSPICE | Facility: HOSPICE | Age: 78
End: 2018-01-01
Payer: MEDICARE

## 2018-01-01 ENCOUNTER — APPOINTMENT (OUTPATIENT)
Dept: GENERAL RADIOLOGY | Age: 78
End: 2018-01-01
Attending: EMERGENCY MEDICINE
Payer: MEDICARE

## 2018-01-01 ENCOUNTER — HOSPITAL ENCOUNTER (EMERGENCY)
Age: 78
Discharge: HOME | End: 2018-03-24
Attending: STUDENT IN AN ORGANIZED HEALTH CARE EDUCATION/TRAINING PROGRAM
Payer: MEDICARE

## 2018-01-01 ENCOUNTER — DOCUMENTATION ONLY (OUTPATIENT)
Dept: OTHER | Age: 78
End: 2018-01-01

## 2018-01-01 ENCOUNTER — HOSPITAL ENCOUNTER (EMERGENCY)
Age: 78
Discharge: HOME | End: 2018-02-27
Attending: EMERGENCY MEDICINE
Payer: MEDICARE

## 2018-01-01 ENCOUNTER — HOSPITAL ENCOUNTER (EMERGENCY)
Age: 78
Discharge: HOME | End: 2018-02-02
Attending: STUDENT IN AN ORGANIZED HEALTH CARE EDUCATION/TRAINING PROGRAM
Payer: MEDICARE

## 2018-01-01 VITALS — RESPIRATION RATE: 28 BRPM | SYSTOLIC BLOOD PRESSURE: 120 MMHG | HEART RATE: 68 BPM | DIASTOLIC BLOOD PRESSURE: 70 MMHG

## 2018-01-01 VITALS
OXYGEN SATURATION: 96 % | DIASTOLIC BLOOD PRESSURE: 60 MMHG | TEMPERATURE: 96.9 F | SYSTOLIC BLOOD PRESSURE: 108 MMHG | HEART RATE: 63 BPM | RESPIRATION RATE: 16 BRPM

## 2018-01-01 VITALS — HEART RATE: 80 BPM | SYSTOLIC BLOOD PRESSURE: 140 MMHG | DIASTOLIC BLOOD PRESSURE: 80 MMHG | RESPIRATION RATE: 16 BRPM

## 2018-01-01 VITALS — SYSTOLIC BLOOD PRESSURE: 158 MMHG | HEART RATE: 103 BPM | DIASTOLIC BLOOD PRESSURE: 91 MMHG | RESPIRATION RATE: 22 BRPM

## 2018-01-01 VITALS
OXYGEN SATURATION: 96 % | HEART RATE: 90 BPM | RESPIRATION RATE: 20 BRPM | SYSTOLIC BLOOD PRESSURE: 102 MMHG | DIASTOLIC BLOOD PRESSURE: 64 MMHG

## 2018-01-01 VITALS
DIASTOLIC BLOOD PRESSURE: 76 MMHG | OXYGEN SATURATION: 94 % | SYSTOLIC BLOOD PRESSURE: 102 MMHG | HEART RATE: 76 BPM | RESPIRATION RATE: 20 BRPM | TEMPERATURE: 98.4 F

## 2018-01-01 VITALS
DIASTOLIC BLOOD PRESSURE: 70 MMHG | TEMPERATURE: 97.4 F | SYSTOLIC BLOOD PRESSURE: 122 MMHG | HEART RATE: 86 BPM | RESPIRATION RATE: 18 BRPM

## 2018-01-01 VITALS
SYSTOLIC BLOOD PRESSURE: 112 MMHG | RESPIRATION RATE: 20 BRPM | HEART RATE: 88 BPM | DIASTOLIC BLOOD PRESSURE: 68 MMHG | OXYGEN SATURATION: 100 %

## 2018-01-01 VITALS
RESPIRATION RATE: 17 BRPM | SYSTOLIC BLOOD PRESSURE: 114 MMHG | OXYGEN SATURATION: 95 % | HEART RATE: 78 BPM | DIASTOLIC BLOOD PRESSURE: 68 MMHG

## 2018-01-01 VITALS
RESPIRATION RATE: 18 BRPM | SYSTOLIC BLOOD PRESSURE: 122 MMHG | HEART RATE: 88 BPM | OXYGEN SATURATION: 97 % | DIASTOLIC BLOOD PRESSURE: 82 MMHG

## 2018-01-01 VITALS — HEART RATE: 70 BPM | RESPIRATION RATE: 15 BRPM | SYSTOLIC BLOOD PRESSURE: 132 MMHG | DIASTOLIC BLOOD PRESSURE: 78 MMHG

## 2018-01-01 VITALS — SYSTOLIC BLOOD PRESSURE: 160 MMHG | RESPIRATION RATE: 22 BRPM | HEART RATE: 90 BPM | DIASTOLIC BLOOD PRESSURE: 80 MMHG

## 2018-01-01 VITALS
HEIGHT: 67 IN | DIASTOLIC BLOOD PRESSURE: 67 MMHG | TEMPERATURE: 98.8 F | BODY MASS INDEX: 16.95 KG/M2 | HEART RATE: 104 BPM | WEIGHT: 108 LBS | RESPIRATION RATE: 20 BRPM | SYSTOLIC BLOOD PRESSURE: 125 MMHG | OXYGEN SATURATION: 98 %

## 2018-01-01 VITALS
RESPIRATION RATE: 20 BRPM | SYSTOLIC BLOOD PRESSURE: 138 MMHG | HEART RATE: 100 BPM | DIASTOLIC BLOOD PRESSURE: 80 MMHG | OXYGEN SATURATION: 99 %

## 2018-01-01 VITALS — SYSTOLIC BLOOD PRESSURE: 140 MMHG | RESPIRATION RATE: 14 BRPM | HEART RATE: 80 BPM | DIASTOLIC BLOOD PRESSURE: 80 MMHG

## 2018-01-01 VITALS
SYSTOLIC BLOOD PRESSURE: 129 MMHG | DIASTOLIC BLOOD PRESSURE: 74 MMHG | RESPIRATION RATE: 24 BRPM | HEART RATE: 120 BPM | TEMPERATURE: 97.7 F

## 2018-01-01 VITALS
DIASTOLIC BLOOD PRESSURE: 70 MMHG | RESPIRATION RATE: 18 BRPM | SYSTOLIC BLOOD PRESSURE: 118 MMHG | TEMPERATURE: 97.2 F | HEART RATE: 86 BPM | OXYGEN SATURATION: 95 %

## 2018-01-01 VITALS
DIASTOLIC BLOOD PRESSURE: 68 MMHG | HEART RATE: 88 BPM | OXYGEN SATURATION: 94 % | SYSTOLIC BLOOD PRESSURE: 102 MMHG | RESPIRATION RATE: 18 BRPM

## 2018-01-01 VITALS
TEMPERATURE: 97.7 F | SYSTOLIC BLOOD PRESSURE: 112 MMHG | OXYGEN SATURATION: 94 % | DIASTOLIC BLOOD PRESSURE: 68 MMHG | RESPIRATION RATE: 18 BRPM | HEART RATE: 98 BPM

## 2018-01-01 VITALS
SYSTOLIC BLOOD PRESSURE: 140 MMHG | HEART RATE: 140 BPM | DIASTOLIC BLOOD PRESSURE: 82 MMHG | TEMPERATURE: 97.8 F | RESPIRATION RATE: 24 BRPM

## 2018-01-01 VITALS
HEART RATE: 72 BPM | DIASTOLIC BLOOD PRESSURE: 72 MMHG | TEMPERATURE: 98 F | SYSTOLIC BLOOD PRESSURE: 118 MMHG | RESPIRATION RATE: 24 BRPM

## 2018-01-01 VITALS
RESPIRATION RATE: 18 BRPM | SYSTOLIC BLOOD PRESSURE: 140 MMHG | HEART RATE: 88 BPM | DIASTOLIC BLOOD PRESSURE: 70 MMHG | RESPIRATION RATE: 16 BRPM | HEART RATE: 80 BPM | DIASTOLIC BLOOD PRESSURE: 80 MMHG | SYSTOLIC BLOOD PRESSURE: 140 MMHG

## 2018-01-01 VITALS — RESPIRATION RATE: 18 BRPM | HEART RATE: 88 BPM | DIASTOLIC BLOOD PRESSURE: 80 MMHG | SYSTOLIC BLOOD PRESSURE: 140 MMHG

## 2018-01-01 VITALS
DIASTOLIC BLOOD PRESSURE: 80 MMHG | SYSTOLIC BLOOD PRESSURE: 132 MMHG | RESPIRATION RATE: 20 BRPM | TEMPERATURE: 97 F | HEART RATE: 142 BPM

## 2018-01-01 VITALS
TEMPERATURE: 97.2 F | RESPIRATION RATE: 20 BRPM | DIASTOLIC BLOOD PRESSURE: 60 MMHG | SYSTOLIC BLOOD PRESSURE: 100 MMHG | HEART RATE: 94 BPM

## 2018-01-01 VITALS
OXYGEN SATURATION: 96 % | HEART RATE: 89 BPM | WEIGHT: 112 LBS | BODY MASS INDEX: 18.66 KG/M2 | RESPIRATION RATE: 20 BRPM | HEIGHT: 65 IN | DIASTOLIC BLOOD PRESSURE: 44 MMHG | SYSTOLIC BLOOD PRESSURE: 99 MMHG | TEMPERATURE: 97.3 F

## 2018-01-01 VITALS
DIASTOLIC BLOOD PRESSURE: 68 MMHG | SYSTOLIC BLOOD PRESSURE: 122 MMHG | RESPIRATION RATE: 18 BRPM | TEMPERATURE: 96.9 F | HEART RATE: 69 BPM

## 2018-01-01 VITALS
RESPIRATION RATE: 18 BRPM | SYSTOLIC BLOOD PRESSURE: 140 MMHG | SYSTOLIC BLOOD PRESSURE: 130 MMHG | RESPIRATION RATE: 28 BRPM | HEART RATE: 100 BPM | HEART RATE: 90 BPM | DIASTOLIC BLOOD PRESSURE: 80 MMHG | DIASTOLIC BLOOD PRESSURE: 70 MMHG

## 2018-01-01 VITALS
SYSTOLIC BLOOD PRESSURE: 122 MMHG | HEART RATE: 100 BPM | RESPIRATION RATE: 15 BRPM | TEMPERATURE: 95.9 F | DIASTOLIC BLOOD PRESSURE: 78 MMHG

## 2018-01-01 VITALS
RESPIRATION RATE: 28 BRPM | DIASTOLIC BLOOD PRESSURE: 73 MMHG | SYSTOLIC BLOOD PRESSURE: 126 MMHG | TEMPERATURE: 97.6 F | HEART RATE: 117 BPM | OXYGEN SATURATION: 85 %

## 2018-01-01 VITALS
RESPIRATION RATE: 24 BRPM | DIASTOLIC BLOOD PRESSURE: 58 MMHG | SYSTOLIC BLOOD PRESSURE: 124 MMHG | BODY MASS INDEX: 17.54 KG/M2 | TEMPERATURE: 97.9 F | WEIGHT: 112 LBS | OXYGEN SATURATION: 100 % | HEART RATE: 82 BPM

## 2018-01-01 VITALS — DIASTOLIC BLOOD PRESSURE: 70 MMHG | RESPIRATION RATE: 14 BRPM | SYSTOLIC BLOOD PRESSURE: 122 MMHG | HEART RATE: 82 BPM

## 2018-01-01 VITALS
DIASTOLIC BLOOD PRESSURE: 70 MMHG | SYSTOLIC BLOOD PRESSURE: 130 MMHG | HEART RATE: 84 BPM | TEMPERATURE: 98.1 F | RESPIRATION RATE: 20 BRPM

## 2018-01-01 VITALS — DIASTOLIC BLOOD PRESSURE: 60 MMHG | HEART RATE: 104 BPM | SYSTOLIC BLOOD PRESSURE: 130 MMHG | RESPIRATION RATE: 26 BRPM

## 2018-01-01 VITALS — RESPIRATION RATE: 30 BRPM | SYSTOLIC BLOOD PRESSURE: 150 MMHG | DIASTOLIC BLOOD PRESSURE: 80 MMHG | HEART RATE: 90 BPM

## 2018-01-01 VITALS — HEART RATE: 88 BPM | RESPIRATION RATE: 18 BRPM | SYSTOLIC BLOOD PRESSURE: 112 MMHG | DIASTOLIC BLOOD PRESSURE: 62 MMHG

## 2018-01-01 VITALS — SYSTOLIC BLOOD PRESSURE: 140 MMHG | HEART RATE: 88 BPM | DIASTOLIC BLOOD PRESSURE: 80 MMHG | RESPIRATION RATE: 18 BRPM

## 2018-01-01 VITALS — SYSTOLIC BLOOD PRESSURE: 130 MMHG | HEART RATE: 88 BPM | RESPIRATION RATE: 18 BRPM | DIASTOLIC BLOOD PRESSURE: 80 MMHG

## 2018-01-01 VITALS — RESPIRATION RATE: 28 BRPM | SYSTOLIC BLOOD PRESSURE: 118 MMHG | DIASTOLIC BLOOD PRESSURE: 70 MMHG | HEART RATE: 112 BPM

## 2018-01-01 DIAGNOSIS — J44.1 COPD EXACERBATION (HCC): Primary | ICD-10-CM

## 2018-01-01 DIAGNOSIS — I95.9 HYPOTENSION, UNSPECIFIED HYPOTENSION TYPE: Primary | ICD-10-CM

## 2018-01-01 DIAGNOSIS — R05.9 COUGH: ICD-10-CM

## 2018-01-01 DIAGNOSIS — I49.1 PREMATURE ATRIAL CONTRACTIONS: ICD-10-CM

## 2018-01-01 DIAGNOSIS — J41.0 SIMPLE CHRONIC BRONCHITIS (HCC): ICD-10-CM

## 2018-01-01 LAB
ALBUMIN SERPL-MCNC: 3 G/DL (ref 3.2–4.6)
ALBUMIN SERPL-MCNC: 3.1 G/DL (ref 3.2–4.6)
ALBUMIN SERPL-MCNC: 3.4 G/DL (ref 3.2–4.6)
ALBUMIN/GLOB SERPL: 0.7 {RATIO} (ref 1.2–3.5)
ALBUMIN/GLOB SERPL: 0.9 {RATIO} (ref 1.2–3.5)
ALBUMIN/GLOB SERPL: 1 {RATIO} (ref 1.2–3.5)
ALP SERPL-CCNC: 104 U/L (ref 50–136)
ALP SERPL-CCNC: 134 U/L (ref 50–136)
ALP SERPL-CCNC: 156 U/L (ref 50–136)
ALT SERPL-CCNC: 19 U/L (ref 12–65)
ALT SERPL-CCNC: 23 U/L (ref 12–65)
ALT SERPL-CCNC: 24 U/L (ref 12–65)
ANION GAP SERPL CALC-SCNC: 12 MMOL/L (ref 7–16)
ANION GAP SERPL CALC-SCNC: 5 MMOL/L (ref 7–16)
ANION GAP SERPL CALC-SCNC: 6 MMOL/L (ref 7–16)
AST SERPL-CCNC: 20 U/L (ref 15–37)
AST SERPL-CCNC: 26 U/L (ref 15–37)
AST SERPL-CCNC: 26 U/L (ref 15–37)
ATRIAL RATE: 122 BPM
ATRIAL RATE: 80 BPM
ATRIAL RATE: 95 BPM
ATRIAL RATE: 97 BPM
BASOPHILS # BLD: 0 K/UL (ref 0–0.2)
BASOPHILS NFR BLD: 0 % (ref 0–2)
BASOPHILS NFR BLD: 0 % (ref 0–2)
BASOPHILS NFR BLD: 1 % (ref 0–2)
BILIRUB SERPL-MCNC: 0.2 MG/DL (ref 0.2–1.1)
BILIRUB SERPL-MCNC: 0.2 MG/DL (ref 0.2–1.1)
BILIRUB SERPL-MCNC: 0.3 MG/DL (ref 0.2–1.1)
BNP SERPL-MCNC: 42 PG/ML
BNP SERPL-MCNC: 76 PG/ML
BUN SERPL-MCNC: 12 MG/DL (ref 8–23)
BUN SERPL-MCNC: 15 MG/DL (ref 8–23)
BUN SERPL-MCNC: 8 MG/DL (ref 8–23)
CALCIUM SERPL-MCNC: 8.5 MG/DL (ref 8.3–10.4)
CALCIUM SERPL-MCNC: 8.7 MG/DL (ref 8.3–10.4)
CALCIUM SERPL-MCNC: 9.5 MG/DL (ref 8.3–10.4)
CALCULATED P AXIS, ECG09: 90 DEGREES
CALCULATED P AXIS, ECG09: 90 DEGREES
CALCULATED P AXIS, ECG09: 95 DEGREES
CALCULATED P AXIS, ECG09: 96 DEGREES
CALCULATED R AXIS, ECG10: -84 DEGREES
CALCULATED R AXIS, ECG10: -86 DEGREES
CALCULATED R AXIS, ECG10: -88 DEGREES
CALCULATED R AXIS, ECG10: -92 DEGREES
CALCULATED T AXIS, ECG11: 76 DEGREES
CALCULATED T AXIS, ECG11: 80 DEGREES
CALCULATED T AXIS, ECG11: 82 DEGREES
CALCULATED T AXIS, ECG11: 85 DEGREES
CHLORIDE SERPL-SCNC: 100 MMOL/L (ref 98–107)
CHLORIDE SERPL-SCNC: 107 MMOL/L (ref 98–107)
CHLORIDE SERPL-SCNC: 98 MMOL/L (ref 98–107)
CO2 SERPL-SCNC: 25 MMOL/L (ref 21–32)
CO2 SERPL-SCNC: 35 MMOL/L (ref 21–32)
CO2 SERPL-SCNC: 36 MMOL/L (ref 21–32)
CREAT SERPL-MCNC: 0.67 MG/DL (ref 0.6–1)
CREAT SERPL-MCNC: 0.68 MG/DL (ref 0.6–1)
CREAT SERPL-MCNC: 0.86 MG/DL (ref 0.6–1)
D DIMER PPP FEU-MCNC: 0.53 UG/ML(FEU)
D DIMER PPP FEU-MCNC: 0.57 UG/ML(FEU)
DIAGNOSIS, 93000: NORMAL
DIFFERENTIAL METHOD BLD: ABNORMAL
DIFFERENTIAL METHOD BLD: ABNORMAL
DIFFERENTIAL METHOD BLD: NORMAL
EOSINOPHIL # BLD: 0 K/UL (ref 0–0.8)
EOSINOPHIL # BLD: 0.1 K/UL (ref 0–0.8)
EOSINOPHIL # BLD: 0.2 K/UL (ref 0–0.8)
EOSINOPHIL NFR BLD: 0 % (ref 0.5–7.8)
EOSINOPHIL NFR BLD: 1 % (ref 0.5–7.8)
EOSINOPHIL NFR BLD: 2 % (ref 0.5–7.8)
ERYTHROCYTE [DISTWIDTH] IN BLOOD BY AUTOMATED COUNT: 14.4 % (ref 11.9–14.6)
ERYTHROCYTE [DISTWIDTH] IN BLOOD BY AUTOMATED COUNT: 14.6 % (ref 11.9–14.6)
ERYTHROCYTE [DISTWIDTH] IN BLOOD BY AUTOMATED COUNT: 15 % (ref 11.9–14.6)
GLOBULIN SER CALC-MCNC: 3.4 G/DL (ref 2.3–3.5)
GLOBULIN SER CALC-MCNC: 3.5 G/DL (ref 2.3–3.5)
GLOBULIN SER CALC-MCNC: 4.2 G/DL (ref 2.3–3.5)
GLUCOSE SERPL-MCNC: 107 MG/DL (ref 65–100)
GLUCOSE SERPL-MCNC: 122 MG/DL (ref 65–100)
GLUCOSE SERPL-MCNC: 82 MG/DL (ref 65–100)
HCT VFR BLD AUTO: 41.9 % (ref 35.8–46.3)
HCT VFR BLD AUTO: 45.4 % (ref 35.8–46.3)
HCT VFR BLD AUTO: 45.6 % (ref 35.8–46.3)
HGB BLD-MCNC: 13.3 G/DL (ref 11.7–15.4)
HGB BLD-MCNC: 14.6 G/DL (ref 11.7–15.4)
HGB BLD-MCNC: 14.7 G/DL (ref 11.7–15.4)
IMM GRANULOCYTES # BLD: 0 K/UL (ref 0–0.5)
IMM GRANULOCYTES # BLD: 0 K/UL (ref 0–0.5)
IMM GRANULOCYTES # BLD: 0.1 K/UL (ref 0–0.5)
IMM GRANULOCYTES NFR BLD AUTO: 0 % (ref 0–5)
INR PPP: 0.9
LYMPHOCYTES # BLD: 1 K/UL (ref 0.5–4.6)
LYMPHOCYTES # BLD: 1.1 K/UL (ref 0.5–4.6)
LYMPHOCYTES # BLD: 1.6 K/UL (ref 0.5–4.6)
LYMPHOCYTES NFR BLD: 19 % (ref 13–44)
LYMPHOCYTES NFR BLD: 7 % (ref 13–44)
LYMPHOCYTES NFR BLD: 9 % (ref 13–44)
MAGNESIUM SERPL-MCNC: 2.2 MG/DL (ref 1.8–2.4)
MCH RBC QN AUTO: 28.5 PG (ref 26.1–32.9)
MCH RBC QN AUTO: 29.2 PG (ref 26.1–32.9)
MCH RBC QN AUTO: 29.2 PG (ref 26.1–32.9)
MCHC RBC AUTO-ENTMCNC: 31.7 G/DL (ref 31.4–35)
MCHC RBC AUTO-ENTMCNC: 32.2 G/DL (ref 31.4–35)
MCHC RBC AUTO-ENTMCNC: 32.2 G/DL (ref 31.4–35)
MCV RBC AUTO: 88.7 FL (ref 79.6–97.8)
MCV RBC AUTO: 90.7 FL (ref 79.6–97.8)
MCV RBC AUTO: 91.9 FL (ref 79.6–97.8)
MONOCYTES # BLD: 0.4 K/UL (ref 0.1–1.3)
MONOCYTES # BLD: 0.5 K/UL (ref 0.1–1.3)
MONOCYTES # BLD: 0.8 K/UL (ref 0.1–1.3)
MONOCYTES NFR BLD: 5 % (ref 4–12)
NEUTS SEG # BLD: 14.4 K/UL (ref 1.7–8.2)
NEUTS SEG # BLD: 6 K/UL (ref 1.7–8.2)
NEUTS SEG # BLD: 9.9 K/UL (ref 1.7–8.2)
NEUTS SEG NFR BLD: 73 % (ref 43–78)
NEUTS SEG NFR BLD: 85 % (ref 43–78)
NEUTS SEG NFR BLD: 88 % (ref 43–78)
P-R INTERVAL, ECG05: 128 MS
P-R INTERVAL, ECG05: 130 MS
P-R INTERVAL, ECG05: 134 MS
P-R INTERVAL, ECG05: 136 MS
PLATELET # BLD AUTO: 283 K/UL (ref 150–450)
PLATELET # BLD AUTO: 309 K/UL (ref 150–450)
PLATELET # BLD AUTO: 352 K/UL (ref 150–450)
PMV BLD AUTO: 10.3 FL (ref 10.8–14.1)
PMV BLD AUTO: 10.6 FL (ref 10.8–14.1)
PMV BLD AUTO: 11 FL (ref 10.8–14.1)
POTASSIUM SERPL-SCNC: 3.7 MMOL/L (ref 3.5–5.1)
POTASSIUM SERPL-SCNC: 3.9 MMOL/L (ref 3.5–5.1)
POTASSIUM SERPL-SCNC: 4.1 MMOL/L (ref 3.5–5.1)
PROT SERPL-MCNC: 6.6 G/DL (ref 6.3–8.2)
PROT SERPL-MCNC: 6.8 G/DL (ref 6.3–8.2)
PROT SERPL-MCNC: 7.2 G/DL (ref 6.3–8.2)
PROTHROMBIN TIME: 11.8 SEC (ref 11.5–14.5)
Q-T INTERVAL, ECG07: 308 MS
Q-T INTERVAL, ECG07: 318 MS
Q-T INTERVAL, ECG07: 334 MS
Q-T INTERVAL, ECG07: 360 MS
QRS DURATION, ECG06: 80 MS
QRS DURATION, ECG06: 84 MS
QRS DURATION, ECG06: 86 MS
QRS DURATION, ECG06: 90 MS
QTC CALCULATION (BEZET), ECG08: 403 MS
QTC CALCULATION (BEZET), ECG08: 415 MS
QTC CALCULATION (BEZET), ECG08: 419 MS
QTC CALCULATION (BEZET), ECG08: 438 MS
RBC # BLD AUTO: 4.56 M/UL (ref 4.05–5.25)
RBC # BLD AUTO: 5.03 M/UL (ref 4.05–5.25)
RBC # BLD AUTO: 5.12 M/UL (ref 4.05–5.25)
SODIUM SERPL-SCNC: 139 MMOL/L (ref 136–145)
SODIUM SERPL-SCNC: 141 MMOL/L (ref 136–145)
SODIUM SERPL-SCNC: 144 MMOL/L (ref 136–145)
TROPONIN I BLD-MCNC: 0 NG/ML (ref 0.02–0.05)
TROPONIN I SERPL-MCNC: <0.02 NG/ML (ref 0.02–0.05)
TROPONIN I SERPL-MCNC: <0.02 NG/ML (ref 0.02–0.05)
TSH SERPL DL<=0.005 MIU/L-ACNC: 0.83 UIU/ML (ref 0.36–3.74)
VENTRICULAR RATE, ECG03: 122 BPM
VENTRICULAR RATE, ECG03: 80 BPM
VENTRICULAR RATE, ECG03: 95 BPM
VENTRICULAR RATE, ECG03: 97 BPM
WBC # BLD AUTO: 11.7 K/UL (ref 4.3–11.1)
WBC # BLD AUTO: 16.4 K/UL (ref 4.3–11.1)
WBC # BLD AUTO: 8.3 K/UL (ref 4.3–11.1)

## 2018-01-01 PROCEDURE — 74011250636 HC RX REV CODE- 250/636: Performed by: STUDENT IN AN ORGANIZED HEALTH CARE EDUCATION/TRAINING PROGRAM

## 2018-01-01 PROCEDURE — A6250 SKIN SEAL PROTECT MOISTURIZR: HCPCS

## 2018-01-01 PROCEDURE — 84484 ASSAY OF TROPONIN QUANT: CPT

## 2018-01-01 PROCEDURE — 0651 HSPC ROUTINE HOME CARE

## 2018-01-01 PROCEDURE — 70360 X-RAY EXAM OF NECK: CPT

## 2018-01-01 PROCEDURE — G0299 HHS/HOSPICE OF RN EA 15 MIN: HCPCS

## 2018-01-01 PROCEDURE — 74011250636 HC RX REV CODE- 250/636: Performed by: EMERGENCY MEDICINE

## 2018-01-01 PROCEDURE — G0157 HHC PT ASSISTANT EA 15: HCPCS

## 2018-01-01 PROCEDURE — HOSPICE MEDICATION HC HH HOSPICE MEDICATION

## 2018-01-01 PROCEDURE — 74011000250 HC RX REV CODE- 250: Performed by: EMERGENCY MEDICINE

## 2018-01-01 PROCEDURE — 94640 AIRWAY INHALATION TREATMENT: CPT

## 2018-01-01 PROCEDURE — 93005 ELECTROCARDIOGRAM TRACING: CPT | Performed by: EMERGENCY MEDICINE

## 2018-01-01 PROCEDURE — G0156 HHCP-SVS OF AIDE,EA 15 MIN: HCPCS

## 2018-01-01 PROCEDURE — A4927 NON-STERILE GLOVES: HCPCS

## 2018-01-01 PROCEDURE — A9270 NON-COVERED ITEM OR SERVICE: HCPCS

## 2018-01-01 PROCEDURE — 96374 THER/PROPH/DIAG INJ IV PUSH: CPT | Performed by: EMERGENCY MEDICINE

## 2018-01-01 PROCEDURE — 71046 X-RAY EXAM CHEST 2 VIEWS: CPT

## 2018-01-01 PROCEDURE — 80053 COMPREHEN METABOLIC PANEL: CPT | Performed by: EMERGENCY MEDICINE

## 2018-01-01 PROCEDURE — 99285 EMERGENCY DEPT VISIT HI MDM: CPT | Performed by: EMERGENCY MEDICINE

## 2018-01-01 PROCEDURE — 83880 ASSAY OF NATRIURETIC PEPTIDE: CPT | Performed by: STUDENT IN AN ORGANIZED HEALTH CARE EDUCATION/TRAINING PROGRAM

## 2018-01-01 PROCEDURE — G0155 HHCP-SVS OF CSW,EA 15 MIN: HCPCS

## 2018-01-01 PROCEDURE — G0151 HHCP-SERV OF PT,EA 15 MIN: HCPCS

## 2018-01-01 PROCEDURE — 71045 X-RAY EXAM CHEST 1 VIEW: CPT

## 2018-01-01 PROCEDURE — 74011000250 HC RX REV CODE- 250: Performed by: STUDENT IN AN ORGANIZED HEALTH CARE EDUCATION/TRAINING PROGRAM

## 2018-01-01 PROCEDURE — A6212 FOAM DRG <=16 SQ IN W/BORDER: HCPCS

## 2018-01-01 PROCEDURE — 85610 PROTHROMBIN TIME: CPT | Performed by: STUDENT IN AN ORGANIZED HEALTH CARE EDUCATION/TRAINING PROGRAM

## 2018-01-01 PROCEDURE — 85025 COMPLETE CBC W/AUTO DIFF WBC: CPT | Performed by: EMERGENCY MEDICINE

## 2018-01-01 PROCEDURE — 99284 EMERGENCY DEPT VISIT MOD MDM: CPT | Performed by: STUDENT IN AN ORGANIZED HEALTH CARE EDUCATION/TRAINING PROGRAM

## 2018-01-01 PROCEDURE — E0325 URINAL MALE JUG-TYPE: HCPCS

## 2018-01-01 PROCEDURE — 84484 ASSAY OF TROPONIN QUANT: CPT | Performed by: EMERGENCY MEDICINE

## 2018-01-01 PROCEDURE — T4522 ADULT SIZE BRIEF/DIAPER MED: HCPCS

## 2018-01-01 PROCEDURE — HHS10554 SHAMPOO/BODY WASH 8 OZ ALOE VESTA

## 2018-01-01 PROCEDURE — 93005 ELECTROCARDIOGRAM TRACING: CPT | Performed by: STUDENT IN AN ORGANIZED HEALTH CARE EDUCATION/TRAINING PROGRAM

## 2018-01-01 PROCEDURE — 3336500001 HSPC ELECTION

## 2018-01-01 PROCEDURE — 77030013140 HC MSK NEB VYRM -A

## 2018-01-01 PROCEDURE — 84443 ASSAY THYROID STIM HORMONE: CPT | Performed by: STUDENT IN AN ORGANIZED HEALTH CARE EDUCATION/TRAINING PROGRAM

## 2018-01-01 PROCEDURE — T4526 ADULT SIZE PULL-ON MED: HCPCS

## 2018-01-01 PROCEDURE — 85379 FIBRIN DEGRADATION QUANT: CPT | Performed by: STUDENT IN AN ORGANIZED HEALTH CARE EDUCATION/TRAINING PROGRAM

## 2018-01-01 PROCEDURE — A6260 WOUND CLEANSER ANY TYPE/SIZE: HCPCS

## 2018-01-01 PROCEDURE — 85025 COMPLETE CBC W/AUTO DIFF WBC: CPT | Performed by: STUDENT IN AN ORGANIZED HEALTH CARE EDUCATION/TRAINING PROGRAM

## 2018-01-01 PROCEDURE — T4541 LARGE DISPOSABLE UNDERPAD: HCPCS

## 2018-01-01 PROCEDURE — 94762 N-INVAS EAR/PLS OXIMTRY CONT: CPT | Performed by: EMERGENCY MEDICINE

## 2018-01-01 PROCEDURE — 96361 HYDRATE IV INFUSION ADD-ON: CPT | Performed by: STUDENT IN AN ORGANIZED HEALTH CARE EDUCATION/TRAINING PROGRAM

## 2018-01-01 PROCEDURE — 400013 HH SOC

## 2018-01-01 PROCEDURE — G0152 HHCP-SERV OF OT,EA 15 MIN: HCPCS

## 2018-01-01 PROCEDURE — A6216 NON-STERILE GAUZE<=16 SQ IN: HCPCS

## 2018-01-01 PROCEDURE — 96361 HYDRATE IV INFUSION ADD-ON: CPT | Performed by: EMERGENCY MEDICINE

## 2018-01-01 PROCEDURE — 80053 COMPREHEN METABOLIC PANEL: CPT | Performed by: STUDENT IN AN ORGANIZED HEALTH CARE EDUCATION/TRAINING PROGRAM

## 2018-01-01 PROCEDURE — 96360 HYDRATION IV INFUSION INIT: CPT | Performed by: STUDENT IN AN ORGANIZED HEALTH CARE EDUCATION/TRAINING PROGRAM

## 2018-01-01 PROCEDURE — 96374 THER/PROPH/DIAG INJ IV PUSH: CPT | Performed by: STUDENT IN AN ORGANIZED HEALTH CARE EDUCATION/TRAINING PROGRAM

## 2018-01-01 PROCEDURE — 83735 ASSAY OF MAGNESIUM: CPT | Performed by: STUDENT IN AN ORGANIZED HEALTH CARE EDUCATION/TRAINING PROGRAM

## 2018-01-01 RX ORDER — IPRATROPIUM BROMIDE AND ALBUTEROL SULFATE 2.5; .5 MG/3ML; MG/3ML
3 SOLUTION RESPIRATORY (INHALATION)
Status: COMPLETED | OUTPATIENT
Start: 2018-01-01 | End: 2018-01-01

## 2018-01-01 RX ORDER — PREDNISONE 20 MG/1
TABLET ORAL
Qty: 13 TAB | Refills: 0 | Status: SHIPPED | OUTPATIENT
Start: 2018-01-01 | End: 2018-01-01 | Stop reason: ALTCHOICE

## 2018-01-01 RX ORDER — DEXAMETHASONE SODIUM PHOSPHATE 100 MG/10ML
10 INJECTION INTRAMUSCULAR; INTRAVENOUS
Status: COMPLETED | OUTPATIENT
Start: 2018-01-01 | End: 2018-01-01

## 2018-01-01 RX ORDER — LEVOFLOXACIN 750 MG/1
750 TABLET ORAL DAILY
Qty: 14 TAB | Refills: 0 | Status: SHIPPED | OUTPATIENT
Start: 2018-01-01 | End: 2018-01-01 | Stop reason: ALTCHOICE

## 2018-01-01 RX ORDER — PREDNISONE 20 MG/1
40 TABLET ORAL DAILY
Qty: 8 TAB | Refills: 0 | Status: SHIPPED | OUTPATIENT
Start: 2018-01-01 | End: 2018-01-01 | Stop reason: SDUPTHER

## 2018-01-01 RX ORDER — LEVOFLOXACIN 750 MG/1
750 TABLET ORAL DAILY
Qty: 14 TAB | Refills: 0 | Status: SHIPPED | OUTPATIENT
Start: 2018-01-01 | End: 2018-01-01

## 2018-01-01 RX ORDER — DOXYCYCLINE HYCLATE 100 MG
100 TABLET ORAL 2 TIMES DAILY
Qty: 20 TAB | Refills: 0 | Status: SHIPPED | OUTPATIENT
Start: 2018-01-01 | End: 2018-01-01

## 2018-01-01 RX ADMIN — IPRATROPIUM BROMIDE AND ALBUTEROL SULFATE 3 ML: .5; 3 SOLUTION RESPIRATORY (INHALATION) at 12:20

## 2018-01-01 RX ADMIN — DEXAMETHASONE SODIUM PHOSPHATE 10 MG: 10 INJECTION INTRAMUSCULAR; INTRAVENOUS at 17:06

## 2018-01-01 RX ADMIN — IPRATROPIUM BROMIDE AND ALBUTEROL SULFATE 3 ML: 2.5; .5 SOLUTION RESPIRATORY (INHALATION) at 13:04

## 2018-01-01 RX ADMIN — SODIUM CHLORIDE 250 ML: 900 INJECTION, SOLUTION INTRAVENOUS at 15:51

## 2018-01-01 RX ADMIN — SODIUM CHLORIDE 500 ML: 900 INJECTION, SOLUTION INTRAVENOUS at 22:00

## 2018-01-01 RX ADMIN — IPRATROPIUM BROMIDE AND ALBUTEROL SULFATE 3 ML: .5; 3 SOLUTION RESPIRATORY (INHALATION) at 17:27

## 2018-01-01 RX ADMIN — SODIUM CHLORIDE 500 ML: 900 INJECTION, SOLUTION INTRAVENOUS at 21:11

## 2018-01-01 RX ADMIN — METHYLPREDNISOLONE SODIUM SUCCINATE 125 MG: 125 INJECTION, POWDER, FOR SOLUTION INTRAMUSCULAR; INTRAVENOUS at 12:13

## 2018-01-24 NOTE — Clinical Note
Called patient evening of 1-23 to schedule PT for 1-24, patient in agreement. Arrived at patient's home at designated time, knocked several times, no one came to the door. Called patient and patient's DIL, no answer, left voicemail with both to call me and let me know if patient is okay. As of early am of 1-25, have not received a call.   Will continue to attempt to contact patient

## 2018-01-25 NOTE — PROGRESS NOTES
Pt states she was home all day and may have just been sleeping. She stated she did not have any missed calls from physical therapy. I informed her that we would give her contact # to PT and have them call her to reschedule. Phone # [phone].

## 2018-02-01 NOTE — ED TRIAGE NOTES
Per the family the pt's home health care rn saw here yesterday and noticed her heart was beating fast and irregular. Pt other home health care rn noticed the same thing today. Pt has no hx of an irregular heart rate. Pt denies any pain.

## 2018-02-02 NOTE — DISCHARGE INSTRUCTIONS
Low Blood Pressure: Care Instructions  Your Care Instructions    Blood pressure is a measurement of the force of the blood against the walls of the blood vessels during and after each beat of the heart. Low blood pressure is also called hypotension. It means that your blood pressure is much lower than normal. Some people, especially young, slim women, may have slightly low blood pressure without symptoms. But in many people, low blood pressure can cause symptoms such as feeling dizzy or lightheaded. When your blood pressure is too low, your heart, brain, and other organs do not get enough blood. Low blood pressure can be caused by many things, including heart problems and some medicines. Diabetes that is not under control can cause your blood pressure to drop. And so can a severe allergic reaction or infection. Another cause is dehydration, which is when your body loses too much fluid. Treatment for low blood pressure depends on the cause. Follow-up care is a key part of your treatment and safety. Be sure to make and go to all appointments, and call your doctor if you are having problems. It's also a good idea to know your test results and keep a list of the medicines you take. How can you care for yourself at home? · Drink plenty of fluids, enough so that your urine is light yellow or clear like water. If you have kidney, heart, or liver disease and have to limit fluids, talk with your doctor before you increase the amount of fluids you drink. · Be safe with medicines. Call your doctor if you think you are having a problem with your medicine. You will get more details on the specific medicines your doctor prescribes. · Stand up or get out of bed very slowly to allow your body to adjust.  · Get plenty of rest.  · Do not smoke. Smoking increases your risk of heart attack. If you need help quitting, talk to your doctor about stop-smoking programs and medicines.  These can increase your chances of quitting for good. · Limit alcohol to 2 drinks a day for men and 1 drink a day for women. Alcohol may interfere with your medicine. In addition, alcohol can make your low blood pressure worse by causing your body to lose water. When should you call for help? Call 911 anytime you think you may need emergency care. For example, call if:  ? · You passed out (lost consciousness). ?Call your doctor now or seek immediate medical care if:  ? · You are dizzy or lightheaded, or you feel like you may faint. ? Watch closely for changes in your health, and be sure to contact your doctor if you have any problems. Where can you learn more? Go to http://víctor-fidencio.info/. Enter C304 in the search box to learn more about \"Low Blood Pressure: Care Instructions. \"  Current as of: September 21, 2016  Content Version: 11.4  © 0576-8491 [employer]. Care instructions adapted under license by "[employer] (which disclaims liability or warranty for this information). If you have questions about a medical condition or this instruction, always ask your healthcare professional. [address] any warranty or liability for your use of this information.

## 2018-02-02 NOTE — ED PROVIDER NOTES
HPI Comments: 69-year-old female patient presents with reports of palpitations, shortness of breath  And intermittent chest pain. Patient states her symptoms started yesterday while seated. She is experienced intermittent symptoms for the past 24 hours. She spoke with her primary care physician who recommended she be seen at the emergency department. She denies similar symptoms in the past.  She reports no history of irregular heart rate. Patient suffers from COPD and reports worsened shortness of breath from her baseline in addition to increased productivity when coughing. She denies colored sputum but states she produced a large amount of clear phlegm earlier today. She denies any associated fever or chills. She describes intermittent chest discomfort at the center of her chest, nonradiating and resolving spontaneously. Patient denies any lightheadedness or dizziness. She denies any swelling in her hands or feet. She denies any changes in bowel or bladder habits. Patient is a 65 y.o. female presenting with palpitations. The history is provided by the patient. Palpitations    This is a new problem. The current episode started yesterday. The problem has not changed since onset. The problem occurs hourly. The problem is associated with nothing. Associated symptoms include chest pain, irregular heartbeat, cough, shortness of breath and sputum production. Pertinent negatives include no diaphoresis, no fever, no malaise/fatigue, no numbness, no chest pressure, no claudication, no exertional chest pressure, no near-syncope, no orthopnea, no PND, no syncope, no abdominal pain, no nausea, no vomiting, no headaches, no back pain, no leg pain, no lower extremity edema, no dizziness, no weakness and no hemoptysis. Risk factors include no risk factors. She has tried nothing for the symptoms.         Past Medical History:   Diagnosis Date    Asthma     Bronchitis     Chronic obstructive pulmonary disease ([facility] [address]  Hypertension        No past surgical history on file. Family History:   Problem Relation Age of Onset    No Known Problems Mother     No Known Problems Father        Social History     Social History    Marital status:      Spouse name: N/A    Number of children: N/A    Years of education: N/A     Occupational History    Not on file. Social History Main Topics    Smoking status: Former Smoker     Types: Cigarettes     Quit date: 2/13/2015    Smokeless tobacco: Never Used      Comment: reports is now vaping    Alcohol use No    Drug use: Not on file    Sexual activity: Not on file     Other Topics Concern    Not on file     Social History Narrative         ALLERGIES: Sulfa (sulfonamide antibiotics)    Review of Systems   Constitutional: Negative for diaphoresis, fever and malaise/fatigue. Respiratory: Positive for cough, sputum production and shortness of breath. Negative for hemoptysis. Cardiovascular: Positive for chest pain and palpitations. Negative for orthopnea, claudication, syncope, PND and near-syncope. Gastrointestinal: Negative for abdominal pain, nausea and vomiting. Musculoskeletal: Negative for back pain. Neurological: Negative for dizziness, weakness, numbness and headaches. Vitals:    02/01/18 1558   BP: 90/63   Pulse: (!) 118   Resp: 20   Temp: 97.3 °F (36.3 °C)   SpO2: 93%   Weight: 50.8 kg (112 lb)   Height: 5' 5\" (1.651 m)            Physical Exam     MDM  Number of Diagnoses or Management Options  Hypotension, unspecified hypotension type: new and requires workup  Diagnosis management comments: Laboratory evaluation thus far is unremarkable. .  EKG obtained on arrival and initially showed a tachycardia at 126. There is no evidence of acute ischemia. Appears consistent with sinus tachycardia. Initial troponin within normal limits, chest x-ray shows chronic COPD changes without focal findings.   Patient is currently in a normal sinus rhythm with a rate of 80 beats a minute, On repeat EKG, there is occasional PVCs noted. No acute ischemia. BNP and d-dimer are pending    Patient is orthostatic on repeat vital sign assessment. She becomes dizzy and tachycardic with standing. Her blood pressure dropped significantly. Repeat bolus initiated. We will reevaluate these findings. Patient's dizziness is improved, her blood pressures improved with standing as well. She is still slightly hypertensive upon rising. Patient is able to tolerate by mouth fluids and should be able to maintain hydration at home. I discussed the patient case with the on-call hospitalist who states there is no criteria for admission at this time. Patient is maintained normal sinus rhythm with IV fluid administration. I discussed my concerns for rapid IVF administration at this time and given patient's ability to tolerate by mouth and advised her to drink fluids regularly to ensure hydration at home. Her dizziness is improved as well. Advised patient to return immediately if symptoms worsen.           Amount and/or Complexity of Data Reviewed  Clinical lab tests: ordered and reviewed  Tests in the radiology section of CPT®: ordered and reviewed  Tests in the medicine section of CPT®: ordered and reviewed  Discuss the patient with other providers: yes  Independent visualization of images, tracings, or specimens: yes    Risk of Complications, Morbidity, and/or Mortality  Presenting problems: moderate  Diagnostic procedures: low  Management options: moderate    Patient Progress  Patient progress: stable        ED Course       Procedures

## 2018-02-02 NOTE — ED NOTES
I have reviewed discharge instructions with the patient. The patient verbalized understanding. Patient left ED via Discharge Method: ambulatory to Home with family. Opportunity for questions and clarification provided. Patient given 0 scripts. To continue your aftercare when you leave the hospital, you may receive an automated call from our care team to check in on how you are doing. This is a free service and part of our promise to provide the best care and service to meet your aftercare needs.  If you have questions, or wish to unsubscribe from this service please call [phone]. Thank you for Choosing our [facility] Emergency Department.

## 2018-02-27 NOTE — ED TRIAGE NOTES
Pt reports she was \"sick all weekend\" and has been SOB since this weekend, pt states much better after nebulizer from EMS. Pt states she was on a nebulizer prior to going to the nursing home for rehab, and did not get continued on that upon release from the nursing home in December. Pt also reports that she continues to smoke cigarettes.

## 2018-02-27 NOTE — ED PROVIDER NOTES
Patient is a 77 y.o. female presenting with shortness of breath. The history is provided by the patient. Shortness of Breath   This is a recurrent problem. The average episode lasts 4 days. The problem occurs continuously. The current episode started more than 2 days ago. The problem has been gradually worsening. Associated symptoms include sore throat, cough, sputum production and wheezing. Pertinent negatives include no fever, no coryza, no rhinorrhea, no neck pain, no hemoptysis, no orthopnea, no chest pain, no vomiting, no leg pain and no leg swelling. She has tried beta-agonist inhalers for the symptoms. The treatment provided moderate relief. She has had prior hospitalizations. She has had prior ED visits. Associated medical issues include COPD. Associated medical issues do not include CAD, heart failure or past MI. Past Medical History:   Diagnosis Date    Asthma     Bronchitis     Chronic obstructive pulmonary disease ([facility] [address]     Hypertension        No past surgical history on file. Family History:   Problem Relation Age of Onset    No Known Problems Mother     No Known Problems Father        Social History     Social History    Marital status:      Spouse name: N/A    Number of children: N/A    Years of education: N/A     Occupational History    Not on file. Social History Main Topics    Smoking status: Former Smoker     Types: Cigarettes     Quit date: 2/13/2015    Smokeless tobacco: Never Used      Comment: reports is now vaping    Alcohol use No    Drug use: Not on file    Sexual activity: Not on file     Other Topics Concern    Not on file     Social History Narrative         ALLERGIES: Sulfa (sulfonamide antibiotics)    Review of Systems   Constitutional: Negative for fever. HENT: Positive for sore throat. Negative for congestion and rhinorrhea. Respiratory: Positive for cough, sputum production, shortness of breath and wheezing. Negative for hemoptysis. Cardiovascular: Positive for palpitations. Negative for chest pain, orthopnea and leg swelling. Gastrointestinal: Negative for diarrhea, nausea and vomiting. Endocrine: Negative for polydipsia and polyuria. Genitourinary: Negative for dysuria. Musculoskeletal: Negative for back pain and neck pain. Vitals:    02/27/18 1533 02/27/18 1655 02/27/18 1701   BP: 93/57  125/67   Pulse: 92  (!) 104   Resp: 24  24   Temp: 97.9 °F (36.6 °C)  98.8 °F (37.1 °C)   SpO2: 93% 94% 95%   Weight: 49 kg (108 lb)     Height: 5' 7\" (1.702 m)              Physical Exam   Constitutional: She is oriented to person, place, and time. She appears well-developed and well-nourished. HENT:   Mouth/Throat: Oropharynx is clear and moist.   Eyes: Conjunctivae are normal. Pupils are equal, round, and reactive to light. Neck: Normal range of motion. Neck supple. Cardiovascular: Normal rate, regular rhythm, normal heart sounds and intact distal pulses. No murmur heard. Pulmonary/Chest: No respiratory distress. She has decreased breath sounds in the right upper field, the right lower field, the left upper field and the left lower field. She has no wheezes. Abdominal: Soft. She exhibits no distension. There is no tenderness. There is no rebound and no guarding. Musculoskeletal: Normal range of motion. She exhibits no edema or tenderness. Neurological: She is alert and oriented to person, place, and time. She has normal strength. No sensory deficit. Skin: Skin is warm and dry. Superficial skin breakdown in sacral area without warmth erythema or purulent discharge   Nursing note and vitals reviewed. MDM  Number of Diagnoses or Management Options  COPD exacerbation ([facility] [address]):   Simple chronic bronchitis [facility]):   Diagnosis management comments: COPD exacerbation evaluate for  Pneumonia or pleural effusions. No pleuritic pain to suggest pulmonary embolism. Patient denies chest pain or chest tightness currently.   Nursing notes report some chest tightness resolved with nebulizer treatment prior to arrival.  We'll rule out MI with troponin.    6:21 PM  No pneumonia. We will boost of steroids taper back down to her current  Dose of 10 mg a day. Doxycycline for productive cough. Lungs improved, few scattered wheezes, air movement improved.        Amount and/or Complexity of Data Reviewed  Clinical lab tests: ordered and reviewed (Results for orders placed or performed during the hospital encounter of 02/27/18  -CBC WITH AUTOMATED DIFF       Result                                            Value                         Ref Range                       WBC                                               16.4 (H)                      4.3 - 11.1 K/uL                 RBC                                               5.03                          4.05 - 5.25 M/uL                HGB                                               14.7                          11.7 - 15.4 g/dL                HCT                                               45.6                          35.8 - 46.3 %                   MCV                                               90.7                          79.6 - 97.8 FL                  MCH                                               29.2                          26.1 - 32.9 PG                  MCHC                                              32.2                          31.4 - 35.0 g/dL                RDW                                               15.0 (H)                      11.9 - 14.6 %                   PLATELET                                          283                           150 - 450 K/uL                  MPV                                               10.6 (L)                      10.8 - 14.1 FL                  DF                                                AUTOMATED                                                     NEUTROPHILS                                       88 (H) 43 - 78 %                       LYMPHOCYTES                                       7 (L)                         13 - 44 %                       MONOCYTES                                         5                             4.0 - 12.0 %                    EOSINOPHILS                                       0 (L)                         0.5 - 7.8 %                     BASOPHILS                                         0                             0.0 - 2.0 %                     IMMATURE GRANULOCYTES                             0                             0.0 - 5.0 %                     ABS. NEUTROPHILS                                  14.4 (H)                      1.7 - 8.2 K/UL                  ABS. LYMPHOCYTES                                  1.1                           0.5 - 4.6 K/UL                  ABS. MONOCYTES                                    0.8                           0.1 - 1.3 K/UL                  ABS. EOSINOPHILS                                  0.0                           0.0 - 0.8 K/UL                  ABS. BASOPHILS                                    0.0                           0.0 - 0.2 K/UL                  ABS. IMM.  GRANS.                                  0.1                           0.0 - 0.5 K/UL             -METABOLIC PANEL, COMPREHENSIVE       Result                                            Value                         Ref Range                       Sodium                                            141                           136 - 145 mmol/L                Potassium                                         3.9                           3.5 - 5.1 mmol/L                Chloride                                          100                           98 - 107 mmol/L                 CO2                                               36 (H)                        21 - 32 mmol/L                  Anion gap                                         5 (L)                         7 - 16 mmol/L                   Glucose                                           122 (H)                       65 - 100 mg/dL                  BUN                                               15                            8 - 23 MG/DL                    Creatinine                                        0.86                          0.6 - 1.0 MG/DL                 GFR est AA                                        >60                           >60 ml/min/1.73m2               GFR est non-AA                                    >60                           >60 ml/min/1.73m2               Calcium                                           8.7                           8.3 - 10.4 MG/DL                Bilirubin, total                                  0.3                           0.2 - 1.1 MG/DL                 ALT (SGPT)                                        24                            12 - 65 U/L                     AST (SGOT)                                        26                            15 - 37 U/L                     Alk. phosphatase                                  156 (H)                       50 - 136 U/L                    Protein, total                                    6.8                           6.3 - 8.2 g/dL                  Albumin                                           3.4                           3.2 - 4.6 g/dL                  Globulin                                          3.4                           2.3 - 3.5 g/dL                  A-G Ratio                                         1.0 (L)                       1.2 - 3.5                  -TROPONIN I       Result                                            Value                         Ref Range                       Troponin-I, Qt.                                   <0.02 (L)                     0.02 - 0.05 NG/ML          )  Tests in the radiology section of CPT®: ordered and reviewed (Xr Chest Pa Lat    Result Date: 2/27/2018  Two-view chest x-ray February 27, 2018 Reference exam: February 1, 2018 INDICATION: Wheezing and short of breath with chest pain FINDINGS: Patient is rotated some to the left, lungs show some strandy probable chronic fibrotic changes and hyperinflation again, bones show diffuse demineralization. Cardiac silhouette and pulmonary vascularity appear normal.     IMPRESSION: Hyperinflation with probable chronic changes in the lungs.     )          ED Course       Procedures

## 2018-02-27 NOTE — DISCHARGE INSTRUCTIONS
Learning About Chronic Bronchitis  What is chronic bronchitis? Chronic bronchitis is long-term swelling and the buildup of mucus in the airways of your lungs. The airways (bronchial tubes) get inflamed and make a lot of mucus. This can narrow or block the airways, making it hard for you to breathe. It is a form of COPD (chronic obstructive pulmonary disease). Chronic bronchitis is usually caused by smoking. But chemical fumes, dust, or air pollution also can cause it over time. What can you expect when you have chronic bronchitis? Chronic bronchitis gets worse over time. You cannot undo the damage to your lungs. Over time, you may find that:  · You get short of breath even when you do simple things like get dressed or fix a meal.  · It is hard to eat or exercise. · You lose weight and feel weaker. Over many years, the swelling and mucus from chronic bronchitis make it more likely that you will get lung infections. But there are things you can do to prevent more damage and feel better. What are the symptoms? The main symptoms of chronic bronchitis are:  · A cough that will not go away. · Mucus that comes up when you cough. · Shortness of breath that gets worse when you exercise. At times, your symptoms may suddenly flare up and get much worse. This is a called an exacerbation (say \"egg-ZAJAY JAY-er-BAY-evelin\"). When this happens, your usual symptoms quickly get worse and stay bad. This can be dangerous. You may have to go to the hospital.  How can you keep chronic bronchitis from getting worse? Don't smoke. That is the best way to keep chronic bronchitis from getting worse. If you already smoke, it is never too late to stop. If you need help quitting, talk to your doctor about stop-smoking programs and medicines. These can increase your chances of quitting for good. You can do other things to keep chronic bronchitis from getting worse:  · Avoid bad air.  Air pollution, chemical fumes, and dust also can make chronic bronchitis worse. · Get a flu shot every year. A shot may keep the flu from turning into something more serious, like pneumonia. A flu shot also may lower your chances of having a flare-up. · Get a pneumococcal shot. A shot can prevent some of the serious complications of pneumonia. Ask your doctor how often you should get this shot. How is chronic bronchitis treated? Chronic bronchitis is treated with medicines and oxygen. You also can take steps at home to stay healthy and keep your condition from getting worse. Medicines and oxygen therapy  · You may be taking medicines such as:  ¨ Bronchodilators. These help open your airways and make breathing easier. Bronchodilators are either short-acting (work for 6 to 9 hours) or long-acting (work for 24 hours). You inhale most bronchodilators, so they start to act quickly. Always carry your quick-relief inhaler with you in case you need it while you are away from home. ¨ Corticosteroids. These reduce airway inflammation. They come in pill or inhaled form. You must take these medicines every day for them to work well. ¨ Antibiotics. These medicines are used when you have a bacterial lung infection. · Take your medicines exactly as prescribed. Call your doctor if you think you are having a problem with your medicine. · Oxygen therapy boosts the amount of oxygen in your blood and helps you breathe easier. Use the flow rate your doctor has recommended, and do not change it without talking to your doctor first.  Other care at home  · If your doctor recommends it, get more exercise. Walking is a good choice. Bit by bit, increase the amount you walk every day. Try for at least 30 minutes on most days of the week. · Learn breathing methods-such as breathing through pursed lips-to help you become less short of breath. · If your doctor has not set you up with a pulmonary rehabilitation program, talk to him or her about whether rehab is right for you.  Rehab includes exercise programs, education about your disease and how to manage it, help with diet and other changes, and emotional support. · Eat regular, healthy meals. Use bronchodilators about 1 hour before you eat to make it easier to eat. Eat several small meals instead of three large ones. Drink beverages at the end of the meal. Avoid foods that are hard to chew. Follow-up care is a key part of your treatment and safety. Be sure to make and go to all appointments, and call your doctor if you are having problems. It's also a good idea to know your test results and keep a list of the medicines you take. Where can you learn more? Go to http://víctor-fidencio.info/. Enter C719 in the search box to learn more about \"Learning About Chronic Bronchitis. \"  Current as of: May 12, 2017  Content Version: 11.4  © 1422-6353 [employer]. Care instructions adapted under license by [employer] (which disclaims liability or warranty for this information). If you have questions about a medical condition or this instruction, always ask your healthcare professional. [address] any warranty or liability for your use of this information.

## 2018-02-27 NOTE — ED TRIAGE NOTES
Pt arrived via EMS with c/o heart racing and SOB, felt like she was going to pas out. Wheezing present and was given a duoneb with improvement of SOB and chest pain. NS 250ml given. PIV in R AC of 20g. [id number]. Pt is alert and oriented and is on oxygen at 2.5L continuous at home.

## 2018-02-28 NOTE — ED NOTES
I have reviewed discharge instructions with the patient. The patient verbalized understanding. Patient left ED via Discharge Method: wheelchair to Home with family    Opportunity for questions and clarification provided. Patient given 2 scripts. To continue your aftercare when you leave the hospital, you may receive an automated call from our care team to check in on how you are doing. This is a free service and part of our promise to provide the best care and service to meet your aftercare needs.  If you have questions, or wish to unsubscribe from this service please call [phone]. Thank you for Choosing our [facility] Emergency Department.

## 2018-03-24 NOTE — ED PROVIDER NOTES
HPI Comments: 19-year-old female patient presents with reports of shortness of breath, intermittently productive cough and difficulty swallowing. Patient states she is able to tolerate food and fluids at home without difficulty. She describes her difficulty swallowing as the inability to clear mucus from her throat. Patient states she occasionally gets up a small amount of productivity with coughing that is gray appearing. She reports intermittent shortness of breath secondary to chronic lung disease. She is currently using her normal amount of oxygen at 3.5 L. She reports regular use of nebulizers without improvement. Patient states she contacted her primary care physician who called her in a prescription for Mucinex but she has failed to get this medication filled. She denies any chest pain, fever, chills, lightheaded or dizzy feeling. Denies any changes in bowel or bladder habits or significant swelling in hands or feet. Patient is a 77 y.o. female presenting with shortness of breath. The history is provided by the patient. Shortness of Breath   This is a recurrent problem. The current episode started more than 2 days ago. The problem has been gradually worsening. Associated symptoms include cough, sputum production and wheezing. Pertinent negatives include no fever, no headaches, no coryza, no rhinorrhea, no sore throat, no swollen glands, no ear pain, no neck pain, no hemoptysis, no PND, no orthopnea, no chest pain, no syncope, no vomiting, no abdominal pain, no rash, no leg pain, no leg swelling and no claudication. She has tried beta-agonist inhalers for the symptoms. The treatment provided no relief. She has had prior hospitalizations. She has had prior ED visits. Associated medical issues include asthma, COPD, chronic lung disease and CAD.         Past Medical History:   Diagnosis Date    Asthma     Bronchitis     Chronic obstructive pulmonary disease ([facility] [address]     Hypertension        History reviewed. No pertinent surgical history. Family History:   Problem Relation Age of Onset    No Known Problems Mother     No Known Problems Father        Social History     Social History    Marital status:      Spouse name: N/A    Number of children: N/A    Years of education: N/A     Occupational History    Not on file. Social History Main Topics    Smoking status: Former Smoker     Types: Cigarettes     Quit date: 2/13/2015    Smokeless tobacco: Never Used      Comment: reports is now vaping    Alcohol use No    Drug use: Not on file    Sexual activity: Not on file     Other Topics Concern    Not on file     Social History Narrative         ALLERGIES: Sulfa (sulfonamide antibiotics)    Review of Systems   Constitutional: Negative for chills, diaphoresis and fever. HENT: Negative for congestion, ear pain, rhinorrhea, sneezing and sore throat. Eyes: Negative for visual disturbance. Respiratory: Positive for cough, sputum production, shortness of breath and wheezing. Negative for hemoptysis and chest tightness. Cardiovascular: Negative for chest pain, orthopnea, claudication, leg swelling, syncope and PND. Gastrointestinal: Negative for abdominal pain, blood in stool, diarrhea, nausea and vomiting. Endocrine: Negative for polyuria. Genitourinary: Negative for difficulty urinating, dysuria, flank pain, hematuria and urgency. Musculoskeletal: Negative for back pain, myalgias, neck pain and neck stiffness. Skin: Negative for color change and rash. Neurological: Negative for dizziness, syncope, speech difficulty, weakness, light-headedness, numbness and headaches. Psychiatric/Behavioral: Negative for behavioral problems. All other systems reviewed and are negative.       Vitals:    03/24/18 1133   BP: 118/53   Pulse: (!) 104   Resp: 16   Temp: 97.9 °F (36.6 °C)   SpO2: 98%   Weight: 50.8 kg (112 lb)            Physical Exam   Constitutional: She is oriented to person, place, and time. She appears well-developed and well-nourished. No distress. Alert and oriented to person, place and time. No acute distress. Speaks in clear, fluent sentences. HENT:   Head: Normocephalic and atraumatic. Right Ear: External ear normal.   Left Ear: External ear normal.   Nose: Nose normal.   Speaking in clear, fluent sentences. Tolerating secretions without difficulty. No stridor. Eyes: Conjunctivae and EOM are normal. Pupils are equal, round, and reactive to light. Neck: Normal range of motion. Neck supple. No JVD present. No tracheal deviation present. Cardiovascular: Normal rate, regular rhythm, S1 normal, S2 normal, normal heart sounds and intact distal pulses. Exam reveals no gallop, no distant heart sounds and no friction rub. No murmur heard. Pulmonary/Chest: Effort normal. No accessory muscle usage or stridor. No tachypnea and no bradypnea. No respiratory distress. She has no decreased breath sounds. She has wheezes. She has no rhonchi. She has no rales. She exhibits no tenderness. Course throughout, faint end expiratory wheezing most pronounced over the right upper and lower lung fields. Abdominal: Soft. Normal appearance. She exhibits no distension and no mass. There is no hepatosplenomegaly, splenomegaly or hepatomegaly. There is no tenderness. There is no rigidity, no rebound, no guarding, no CVA tenderness, no tenderness at McBurney's point and negative Mcdaniel's sign. Musculoskeletal: Normal range of motion. She exhibits no edema, tenderness or deformity. Neurological: She is alert and oriented to person, place, and time. No cranial nerve deficit. Skin: Skin is warm and dry. No rash noted. She is not diaphoretic. Psychiatric: She has a normal mood and affect. Her behavior is normal.   Nursing note and vitals reviewed.        MDM  Number of Diagnoses or Management Options  Diagnosis management comments: EKG obtained on arrival shows normal sinus rhythm with a rate of 95 beats a minute. No evidence of acute ischemia. Patient currently speaking clearly, controlling secretions without difficulty and  satting normally on her prescribed 3.5 L nasal cannula. Chest x-ray appears unchanged. D-dimer just out of normal range. Given patient's age and history of COPD I have very low suspicion for pulmonary embolism as cause of patient's pain. I will not pursue CT PE imaging. Soft tissue neck pending.          Amount and/or Complexity of Data Reviewed  Clinical lab tests: ordered and reviewed  Tests in the radiology section of CPT®: ordered and reviewed  Tests in the medicine section of CPT®: reviewed and ordered  Independent visualization of images, tracings, or specimens: yes    Risk of Complications, Morbidity, and/or Mortality  Presenting problems: moderate  Diagnostic procedures: low  Management options: moderate    Patient Progress  Patient progress: stable        ED Course       Procedures

## 2018-03-24 NOTE — ED TRIAGE NOTES
Dyspnea/shortness of breath/productive cough x 1 wk. Seen by GP, no improvement. No wheezing, no decreased lung sounds. 5L NC and NS bolus started by EMS, no neb treatments.

## 2018-03-24 NOTE — ED NOTES
I have reviewed discharge instructions with the patient. The patient verbalized understanding. Patient left ED via Discharge Method: wheelchair to Home with daughter. Opportunity for questions and clarification provided. Patient given 2 scripts. To continue your aftercare when you leave the hospital, you may receive an automated call from our care team to check in on how you are doing. This is a free service and part of our promise to provide the best care and service to meet your aftercare needs.  If you have questions, or wish to unsubscribe from this service please call [phone]. Thank you for Choosing our [facility] Emergency Department.

## 2018-03-24 NOTE — DISCHARGE INSTRUCTIONS
Chronic Obstructive Pulmonary Disease (COPD): Care Instructions  Your Care Instructions    Chronic obstructive pulmonary disease (COPD) is a general term for a group of lung diseases, including emphysema and chronic bronchitis. People with COPD have decreased airflow in and out of the lungs, which makes it hard to breathe. The airways also can get clogged with thick mucus. Cigarette smoking is a major cause of COPD. Although there is no cure for COPD, you can slow its progress. Following your treatment plan and taking care of yourself can help you feel better and live longer. Follow-up care is a key part of your treatment and safety. Be sure to make and go to all appointments, and call your doctor if you are having problems. It's also a good idea to know your test results and keep a list of the medicines you take. How can you care for yourself at home? ?Staying healthy  ? · Do not smoke. This is the most important step you can take to prevent more damage to your lungs. If you need help quitting, talk to your doctor about stop-smoking programs and medicines. These can increase your chances of quitting for good. ? · Avoid colds and flu. Get a pneumococcal vaccine shot. If you have had one before, ask your doctor whether you need a second dose. Get the flu vaccine every fall. If you must be around people with colds or the flu, wash your hands often. ? · Avoid secondhand smoke, air pollution, and high altitudes. Also avoid cold, dry air and hot, humid air. Stay at home with your windows closed when air pollution is bad. ?Medicines and oxygen therapy  ? · Take your medicines exactly as prescribed. Call your doctor if you think you are having a problem with your medicine. ? · You may be taking medicines such as:  ¨ Bronchodilators. These help open your airways and make breathing easier. Bronchodilators are either short-acting (work for 6 to 9 hours) or long-acting (work for 24 hours).  You inhale most bronchodilators, so they start to act quickly. Always carry your quick-relief inhaler with you in case you need it while you are away from home. ¨ Corticosteroids (prednisone, budesonide). These reduce airway inflammation. They come in pill or inhaled form. You must take these medicines every day for them to work well. ? · A spacer may help you get more inhaled medicine to your lungs. Ask your doctor or pharmacist if a spacer is right for you. If it is, ask how to use it properly. ? · Do not take any vitamins, over-the-counter medicine, or herbal products without talking to your doctor first.   ? · If your doctor prescribed antibiotics, take them as directed. Do not stop taking them just because you feel better. You need to take the full course of antibiotics. ? · Oxygen therapy boosts the amount of oxygen in your blood and helps you breathe easier. Use the flow rate your doctor has recommended, and do not change it without talking to your doctor first.   Activity  ? · Get regular exercise. Walking is an easy way to get exercise. Start out slowly, and walk a little more each day. ? · Pay attention to your breathing. You are exercising too hard if you cannot talk while you are exercising. ? · Take short rest breaks when doing household chores and other activities. ? · Learn breathing methods-such as breathing through pursed lips-to help you become less short of breath. ? · If your doctor has not set you up with a pulmonary rehabilitation program, talk to him or her about whether rehab is right for you. Rehab includes exercise programs, education about your disease and how to manage it, help with diet and other changes, and emotional support. Diet  ? · Eat regular, healthy meals. Use bronchodilators about 1 hour before you eat to make it easier to eat. Eat several small meals instead of three large ones. Drink beverages at the end of the meal. Avoid foods that are hard to chew.    ? · Eat foods that contain protein so that you do not lose muscle mass. ? · Talk with your doctor if you gain too much weight or if you lose weight without trying. ?Mental health  ? · Talk to your family, friends, or a therapist about your feelings. It is normal to feel frightened, angry, hopeless, helpless, and even guilty. Talking openly about bad feelings can help you cope. If these feelings last, talk to your doctor. When should you call for help? Call 911 anytime you think you may need emergency care. For example, call if:  ? · You have severe trouble breathing. ?Call your doctor now or seek immediate medical care if:  ? · You have new or worse trouble breathing. ? · You cough up blood. ? · You have a fever. ? Watch closely for changes in your health, and be sure to contact your doctor if:  ? · You cough more deeply or more often, especially if you notice more mucus or a change in the color of your mucus. ? · You have new or worse swelling in your legs or belly. ? · You are not getting better as expected. Where can you learn more? Go to http://víctor-fidencio.info/. [place] in the search box to learn more about \"Chronic Obstructive Pulmonary Disease (COPD): Care Instructions. \"  Current as of: May 12, 2017  Content Version: 11.4  © 6271-3334 [employer]. Care instructions adapted under license by [employer] (which disclaims liability or warranty for this information). If you have questions about a medical condition or this instruction, always ask your healthcare professional. [address] any warranty or liability for your use of this information.

## 2018-05-23 PROBLEM — Z51.5 HOSPICE CARE PATIENT: Status: ACTIVE | Noted: 2018-01-01

## 2018-07-24 VITALS — RESPIRATION RATE: 28 BRPM | HEART RATE: 88 BPM

## 2018-07-26 ENCOUNTER — HOME CARE VISIT (OUTPATIENT)
Dept: HOSPICE | Facility: HOSPICE | Age: 78
End: 2018-07-26
Payer: MEDICARE